# Patient Record
Sex: FEMALE | Race: BLACK OR AFRICAN AMERICAN | Employment: FULL TIME | URBAN - METROPOLITAN AREA
[De-identification: names, ages, dates, MRNs, and addresses within clinical notes are randomized per-mention and may not be internally consistent; named-entity substitution may affect disease eponyms.]

---

## 2017-12-15 PROBLEM — E66.01 OBESITY, MORBID (HCC): Status: ACTIVE | Noted: 2017-12-15

## 2018-05-02 ENCOUNTER — HOSPITAL ENCOUNTER (OUTPATIENT)
Dept: ULTRASOUND IMAGING | Age: 38
Discharge: HOME | End: 2018-05-02
Attending: FAMILY MEDICINE
Payer: COMMERCIAL

## 2018-05-02 DIAGNOSIS — M79.89 LEG SWELLING: ICD-10-CM

## 2018-05-02 PROCEDURE — 93970 EXTREMITY STUDY: CPT

## 2018-05-09 ENCOUNTER — HOSPITAL ENCOUNTER (OUTPATIENT)
Dept: MAMMOGRAPHY | Age: 38
Discharge: HOME | End: 2018-05-09
Attending: FAMILY MEDICINE
Payer: COMMERCIAL

## 2018-05-09 DIAGNOSIS — Z12.39 SCREENING FOR BREAST CANCER: ICD-10-CM

## 2018-05-09 PROCEDURE — 77067 SCR MAMMO BI INCL CAD: CPT

## 2018-09-12 ENCOUNTER — HOSPITAL ENCOUNTER (OUTPATIENT)
Dept: MRI IMAGING | Age: 38
Discharge: HOME | End: 2018-09-12
Attending: FAMILY MEDICINE
Payer: COMMERCIAL

## 2018-09-12 DIAGNOSIS — G89.29 CHRONIC PAIN OF LEFT KNEE: ICD-10-CM

## 2018-09-12 DIAGNOSIS — M25.462 SWELLING OF LEFT KNEE JOINT: ICD-10-CM

## 2018-09-12 DIAGNOSIS — M25.562 CHRONIC PAIN OF LEFT KNEE: ICD-10-CM

## 2018-09-12 PROCEDURE — 73721 MRI JNT OF LWR EXTRE W/O DYE: CPT

## 2019-08-02 ENCOUNTER — HOSPITAL ENCOUNTER (OUTPATIENT)
Dept: PHYSICAL THERAPY | Age: 39
Discharge: HOME | End: 2019-08-02
Attending: FAMILY MEDICINE
Payer: COMMERCIAL

## 2019-08-02 DIAGNOSIS — M79.605 LOW BACK PAIN RADIATING TO LEFT LOWER EXTREMITY: ICD-10-CM

## 2019-08-02 DIAGNOSIS — M54.50 LOW BACK PAIN RADIATING TO LEFT LOWER EXTREMITY: ICD-10-CM

## 2019-08-02 PROCEDURE — 97161 PT EVAL LOW COMPLEX 20 MIN: CPT

## 2019-08-02 PROCEDURE — 97140 MANUAL THERAPY 1/> REGIONS: CPT

## 2019-08-02 NOTE — PROGRESS NOTES
Alis Elkins  : 1980  Primary: [facility]*  Secondary:  [facility], [address] [facility], [address]  [phone]   [phone]       OUTPATIENT PHYSICAL THERAPY: Daily Treatment Note 2019  Visit Count:  1  ICD-10: Treatment Diagnosis: Low back pain (M54.5)  Pain in left leg (M79.605)   Difficulty in walking, not elsewhere classified (R26.2)  Precautions/Allergies:   Lortab [hydrocodone-acetaminophen]   TREATMENT PLAN:  Effective Dates/Frequency/Duration: Twice per week from 2019 until 10/1/2019 (8 weeks). Pre-treatment Symptoms/Complaints:  See history in chart. Pain: Initial:   -2/10 Post Session:  1/10   Medications Last Reviewed:  2019  Updated Objective Findings: See evaluation note from today   TREATMENT:   MANUAL THERAPY: (10 minutes): Joint mobilization and Soft tissue mobilization was utilized and necessary because of the patient's restricted joint motion, painful spasm, loss of articular motion and restricted motion of soft tissue. With pt in hooklying position, performed muscle energy technique at pelvis (resisted R hip flexion and L hip extension and vice versa x 3 reps, followed by resisted hip abduction/adduction x 3 reps, and bridging x 3 reps) x 1 sets to improve pelvic symmetry and reduce muscular tightness and pain. With pt in supported prone position, STM and trigger point release at L latissimus dorsi, erector spinae, quadratus lumborum and thoracolumbar fascia and glutes to reduce muscular tightness and pain. Modalities (10 minutes): With pt in supported prone position, cold pack (with pillowcase layer) applied to entire lower back to reduce pain and inflammation at end of session. Treatment/Session Summary:    · Response to Treatment:  See assessment in chart.         No adverse reactions/unusual changes observed/reported in clinical status this session. · Communication/Consultation:  None today  · Equipment provided today:  None today  · Recommendations/Intent for next treatment session: Next visit will focus on continuing to address pelvic/leg length asymmetry; work on core/LE strengthening exercises; manual therapy/modalities as needed to reduce pain.     Total Treatment Billable Duration:  10 minutes  PT Patient Time In/Time Out  Time In: 1440  Time Out: [address] [facility]    Future Appointments   Date Time Provider Yari Ross   8/22/2019  8:45 AM Mike Jackson MD [facility] PRE PRE
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
## 2020-01-06 NOTE — THERAPY DISCHARGE
Miguel Naidu  : 1980  Primary: [facility]*  Secondary:  [facility], [address] [facility], [place] [address] [facility]  [phone]   [phone]        OUTPATIENT PHYSICAL THERAPY:Discontinuation Summary 2019   ICD-10: Treatment Diagnosis: Low back pain (M54.5)  Pain in left leg (M79.605)   Difficulty in walking, not elsewhere classified (R26.2)  Precautions/Allergies:   Lortab [hydrocodone-acetaminophen]   TREATMENT PLAN:  Effective Dates/Frequency/Duration: Twice per week from 2019 until 10/1/2019 (8 weeks). MEDICAL/REFERRING DIAGNOSIS:  Low back pain radiating to left lower extremity [M54.5, M79.605]   DATE OF ONSET: 2019  REFERRING PHYSICIAN: Rosa Arce*  MD Orders: Evaluate and treat  Return MD Appointment: unspecified     Miguel Naidu has been seen in physical therapy on 2019 for 1 visits. Treatment has been discontinued at this time due to patient noncompliant with attending/scheduling PT sessions. The below goals were not reassessed secondary to pt failing to attend additional sessions prior to discharge. Thank you for this referral.    PROBLEM LIST (Impacting functional limitations):  1. Decreased Strength  2. Decreased ADL/Functional Activities  3. Decreased Transfer Abilities  4. Decreased Ambulation Ability/Technique  5. Decreased Balance  6. Increased Pain  7. Decreased Activity Tolerance  8. Decreased Pacing Skills  9. Decreased Work Simplification/Energy Conservation Techniques  10. Decreased Flexibility/Joint Mobility  11. Edema/Girth INTERVENTIONS PLANNED: (Treatment may consist of any combination of the following)  1. Balance Exercise  2. Bed Mobility  3. Cold  4. Electrical Stimulation  5. Family Education  6. Gait Training  7. Heat  8. Home Exercise Program (HEP)  9. Manual Therapy  10. Neuromuscular Re-education/Strengthening  11.  Range of Motion (ROM)  12. Therapeutic Activites  13. Therapeutic Exercise/Strengthening  14. Transcutaneous Electrical Nerve Stimulation (TENS)  15. Transfer Training  16. Ultrasound (US)  17. Aquatic Therapy (if land therapy does not result in sufficient improvement)     GOALS: (Goals have been discussed and agreed upon with patient.)  Discharge Goals: Time Frame: 8 weeks  1. Pt will be independent with HEP in order to increase lumbar mobility and LE and core strength/endurance to improve quality of life. 2. Pt will reduce score on Modified Oswestry Low Back Pain Questionnaire to 5/50 in order to demonstrate improved functional mobility and quality of life. 3. Pt will report being able to perform standing activities for > 90 minutes with minimal to no increase in pain in order to be able to stand for prolonged periods as needed to perform job tasks at work. 4. Pt will be able to ascend/descend 12 steps with independence without rail with reciprocal gait pattern with minimal to no increase in pain in order to improve community mobility. 5. Pt will demonstrate increase in lumbar extension AROM to 100% or more of normal with minimal to no increase in pain in order to improve functional mobility and ability to stand at work with no pain. OUTCOME MEASURE:   Tool Used: Modified Oswestry Low Back Pain Questionnaire  Score:  Initial: 11/50  Most Recent: X/50 (Date: -- )   Interpretation of Score: Each section is scored on a 0-5 scale, 5 representing the greatest disability. The scores of each section are added together for a total score of 50. FALL RISK:    Ambulatory/Rehab Services H2 Model Falls Risk Assessment   Risk Factors:       No Risk Factors Identified Ability to Rise from Chair:       (0)  Ability to rise in a single movement   Falls Prevention Plan:       No modifications necessary   Total: (5 or greater = High Risk): 0   ©2010 [facility] of [employer]. All Rights Reserved. [facility] Patent #[id number].  [facility] Law prohibits the replication, distribution or use without written permission from [facility] of [employer] you for this referral,  Davian Andrade DPT     Referring Physician Signature: Kai Dempsey* No Signature is Required for this note.

## 2020-02-11 ENCOUNTER — HOSPITAL ENCOUNTER (OUTPATIENT)
Dept: MAMMOGRAPHY | Age: 40
Discharge: HOME | End: 2020-02-11

## 2020-02-11 DIAGNOSIS — Z12.39 BREAST CANCER SCREENING: ICD-10-CM

## 2020-06-03 ENCOUNTER — HOSPITAL ENCOUNTER (OUTPATIENT)
Dept: ULTRASOUND IMAGING | Age: 40
Discharge: HOME | End: 2020-06-03
Attending: FAMILY MEDICINE

## 2020-06-03 DIAGNOSIS — D50.9 IRON DEFICIENCY ANEMIA, UNSPECIFIED IRON DEFICIENCY ANEMIA TYPE: ICD-10-CM

## 2020-06-03 DIAGNOSIS — N92.0 MENORRHAGIA WITH REGULAR CYCLE: ICD-10-CM

## 2021-02-20 ENCOUNTER — HOSPITAL ENCOUNTER (OUTPATIENT)
Dept: MAMMOGRAPHY | Age: 41
Discharge: HOME | End: 2021-02-20
Attending: FAMILY MEDICINE
Payer: COMMERCIAL

## 2021-02-20 DIAGNOSIS — Z12.31 VISIT FOR SCREENING MAMMOGRAM: ICD-10-CM

## 2021-02-20 PROCEDURE — 77063 BREAST TOMOSYNTHESIS BI: CPT

## 2021-08-09 ENCOUNTER — HOSPITAL ENCOUNTER (OUTPATIENT)
Dept: SURGERY | Age: 41
Discharge: HOME | End: 2021-08-09

## 2021-08-09 VITALS — HEIGHT: 65 IN | BODY MASS INDEX: 44.65 KG/M2 | WEIGHT: 268 LBS

## 2021-08-09 NOTE — PERIOP NOTES
Patient verified name and . Order for consent found in EHR and matches case posting; patient verifies procedure. Hysteroscopy with D&C and endometrial ablation/possible removal of endometrial polyp    Type 1B surgery, PAT phone assessment complete. Orders received. Labs per surgeon: CBC- Pt instructed to come for lab work (Monday- Friday 8:00 AM- 3:30 PM) prior to surgery day. Pt voiced an understanding. Labs per anesthesia protocol: no additional lab work needed. Patient COVID test date 8/10/21; Patient aware of the appointment. The testing center is located at the [address], [place]. If appointment is needed-patient provided telephone number of [phone]. Patient answered medical/surgical history questions at their best of ability. All prior to admission medications documented in [facility]. Patient instructed to take the following medications the day of surgery according to anesthesia guidelines with a small sip of water: prozac. On the day before surgery please take Acetaminophen 1000mg in the morning and then again before bed. You may substitute for Tylenol 650 mg. Hold all vitamins/supplements 7 days prior to surgery and NSAIDS 5 days prior to surgery. Patient instructed on the following:    > Arrive at A Entrance, time of arrival to be called the day before by 1700  > NPO after midnight including gum, mints, and ice chips  > Responsible adult must drive patient to the hospital, stay during surgery, and patient will need supervision 24 hours after anesthesia  > Use antibacterial soap in shower the night before surgery and on the morning of surgery  > All piercings must be removed prior to arrival.    > Leave all valuables (money and jewelry) at home but bring insurance card and ID on DOS.   > You may be required to pay a deductible or co-pay on the day of your procedure.  You can pre-pay by calling [phone] if your surgery is at the [address] or [phone] if your surgery is at the [facility]. > Do not wear make-up, nail polish, lotions, cologne, perfumes, powders, or oil on skin. Artificial nails are not permitted.

## 2021-08-10 ENCOUNTER — HOSPITAL ENCOUNTER (OUTPATIENT)
Dept: LAB | Age: 41
Discharge: HOME | End: 2021-08-10
Attending: OBSTETRICS & GYNECOLOGY
Payer: COMMERCIAL

## 2021-08-10 LAB
ERYTHROCYTE [DISTWIDTH] IN BLOOD BY AUTOMATED COUNT: 17.1 % (ref 11.9–14.6)
HCT VFR BLD AUTO: 23.2 % (ref 35.8–46.3)
HGB BLD-MCNC: 6.7 G/DL (ref 11.7–15.4)
MCH RBC QN AUTO: 21.7 PG (ref 26.1–32.9)
MCHC RBC AUTO-ENTMCNC: 28.9 G/DL (ref 31.4–35)
MCV RBC AUTO: 75.1 FL (ref 79.6–97.8)
NRBC # BLD: 0 K/UL (ref 0–0.2)
PLATELET # BLD AUTO: 474 K/UL (ref 150–450)
PMV BLD AUTO: 10.1 FL (ref 9.4–12.3)
RBC # BLD AUTO: 3.09 M/UL (ref 4.05–5.2)
WBC # BLD AUTO: 6.1 K/UL (ref 4.3–11.1)

## 2021-08-10 PROCEDURE — 36415 COLL VENOUS BLD VENIPUNCTURE: CPT

## 2021-08-10 PROCEDURE — 85027 COMPLETE CBC AUTOMATED: CPT

## 2021-08-10 RX ORDER — SODIUM CHLORIDE 9 MG/ML
250 INJECTION, SOLUTION INTRAVENOUS AS NEEDED
Status: CANCELLED | OUTPATIENT
Start: 2021-08-10

## 2021-08-10 NOTE — PERIOP NOTES
Received call from [facility] (lab) informing that patient has critical HGB of 6.7. This RN called surgeon's office reported  Results to Yoly Martini RN who states she will report to MD.     This RN called Dr. Shahid Logan (anesthesia) and reported results. Per Dr. Shahid Logan T&C for 1 unit is needed DOS. Order signed and held in EHR.

## 2021-08-10 NOTE — PERIOP NOTES
The below lab results routed via [address] [facility] to surgeon per protocol. Hgb results reported via phone to both surgeon's office and anesthesia (see previous note). All other lab results within anesthesia guidelines.      Recent Results (from the past 12 hour(s))   CBC W/O DIFF    Collection Time: 08/10/21  9:25 AM   Result Value Ref Range    WBC 6.1 4.3 - 11.1 K/uL    RBC 3.09 (L) 4.05 - 5.2 M/uL    HGB 6.7 (LL) 11.7 - 15.4 g/dL    HCT 23.2 (L) 35.8 - 46.3 %    MCV 75.1 (L) 79.6 - 97.8 FL    MCH 21.7 (L) 26.1 - 32.9 PG    MCHC 28.9 (L) 31.4 - 35.0 g/dL    RDW 17.1 (H) 11.9 - 14.6 %    PLATELET 535 (H) 604 - 450 K/uL    MPV 10.1 9.4 - 12.3 FL    ABSOLUTE NRBC 0.00 0.0 - 0.2 K/uL

## 2021-08-12 ENCOUNTER — ANESTHESIA EVENT (OUTPATIENT)
Dept: SURGERY | Age: 41
End: 2021-08-12
Payer: COMMERCIAL

## 2021-08-13 ENCOUNTER — HOSPITAL ENCOUNTER (OUTPATIENT)
Age: 41
Setting detail: OUTPATIENT SURGERY
Discharge: HOME | End: 2021-08-13
Attending: OBSTETRICS & GYNECOLOGY | Admitting: OBSTETRICS & GYNECOLOGY
Payer: COMMERCIAL

## 2021-08-13 ENCOUNTER — ANESTHESIA (OUTPATIENT)
Dept: SURGERY | Age: 41
End: 2021-08-13
Payer: COMMERCIAL

## 2021-08-13 VITALS
DIASTOLIC BLOOD PRESSURE: 65 MMHG | HEIGHT: 65 IN | SYSTOLIC BLOOD PRESSURE: 133 MMHG | TEMPERATURE: 99.5 F | RESPIRATION RATE: 16 BRPM | BODY MASS INDEX: 45.48 KG/M2 | WEIGHT: 273 LBS | OXYGEN SATURATION: 95 % | HEART RATE: 83 BPM

## 2021-08-13 DIAGNOSIS — G89.18 POST-OP PAIN: Primary | ICD-10-CM

## 2021-08-13 DIAGNOSIS — N92.0 MENORRHAGIA WITH REGULAR CYCLE: ICD-10-CM

## 2021-08-13 DIAGNOSIS — D50.0 IRON DEFICIENCY ANEMIA DUE TO CHRONIC BLOOD LOSS: ICD-10-CM

## 2021-08-13 LAB
HCG SERPL QL: NEGATIVE
HGB BLD-MCNC: 7 G/DL (ref 11.7–15.4)
HISTORY CHECKED?,CKHIST: NORMAL

## 2021-08-13 PROCEDURE — 74011250637 HC RX REV CODE- 250/637: Performed by: ANESTHESIOLOGY

## 2021-08-13 PROCEDURE — 85018 HEMOGLOBIN: CPT

## 2021-08-13 PROCEDURE — 58563 HYSTEROSCOPY ABLATION: CPT | Performed by: OBSTETRICS & GYNECOLOGY

## 2021-08-13 PROCEDURE — 76210000020 HC REC RM PH II FIRST 0.5 HR: Performed by: OBSTETRICS & GYNECOLOGY

## 2021-08-13 PROCEDURE — 2709999900 HC NON-CHARGEABLE SUPPLY: Performed by: OBSTETRICS & GYNECOLOGY

## 2021-08-13 PROCEDURE — 76010000149 HC OR TIME 1 TO 1.5 HR: Performed by: OBSTETRICS & GYNECOLOGY

## 2021-08-13 PROCEDURE — 76210000006 HC OR PH I REC 0.5 TO 1 HR: Performed by: OBSTETRICS & GYNECOLOGY

## 2021-08-13 PROCEDURE — 86923 COMPATIBILITY TEST ELECTRIC: CPT

## 2021-08-13 PROCEDURE — 86901 BLOOD TYPING SEROLOGIC RH(D): CPT

## 2021-08-13 PROCEDURE — 76060000033 HC ANESTHESIA 1 TO 1.5 HR: Performed by: OBSTETRICS & GYNECOLOGY

## 2021-08-13 PROCEDURE — 77030040922 HC BLNKT HYPOTHRM STRY -A: Performed by: ANESTHESIOLOGY

## 2021-08-13 PROCEDURE — 84703 CHORIONIC GONADOTROPIN ASSAY: CPT

## 2021-08-13 PROCEDURE — 77030010509 HC AIRWY LMA MSK TELE -A: Performed by: ANESTHESIOLOGY

## 2021-08-13 PROCEDURE — 77030034928 HC DEV UTER SURSND KT HOLO -G1: Performed by: OBSTETRICS & GYNECOLOGY

## 2021-08-13 PROCEDURE — 77030019927 HC TBNG IRR CYSTO BAXT -A: Performed by: OBSTETRICS & GYNECOLOGY

## 2021-08-13 PROCEDURE — 77030040361 HC SLV COMPR DVT MDII -B: Performed by: OBSTETRICS & GYNECOLOGY

## 2021-08-13 PROCEDURE — 77030002888 HC SUT CHRMC J&J -A: Performed by: OBSTETRICS & GYNECOLOGY

## 2021-08-13 PROCEDURE — 74011250636 HC RX REV CODE- 250/636: Performed by: ANESTHESIOLOGY

## 2021-08-13 PROCEDURE — 74011000250 HC RX REV CODE- 250: Performed by: ANESTHESIOLOGY

## 2021-08-13 PROCEDURE — 88305 TISSUE EXAM BY PATHOLOGIST: CPT

## 2021-08-13 RX ORDER — NALOXONE HYDROCHLORIDE 0.4 MG/ML
0.1 INJECTION, SOLUTION INTRAMUSCULAR; INTRAVENOUS; SUBCUTANEOUS AS NEEDED
Status: DISCONTINUED | OUTPATIENT
Start: 2021-08-13 | End: 2021-08-13 | Stop reason: HOSPADM

## 2021-08-13 RX ORDER — EPHEDRINE SULFATE/0.9% NACL/PF 50 MG/5 ML
SYRINGE (ML) INTRAVENOUS AS NEEDED
Status: DISCONTINUED | OUTPATIENT
Start: 2021-08-13 | End: 2021-08-13 | Stop reason: HOSPADM

## 2021-08-13 RX ORDER — ALBUTEROL SULFATE 0.83 MG/ML
2.5 SOLUTION RESPIRATORY (INHALATION) AS NEEDED
Status: DISCONTINUED | OUTPATIENT
Start: 2021-08-13 | End: 2021-08-13 | Stop reason: HOSPADM

## 2021-08-13 RX ORDER — SODIUM CHLORIDE, SODIUM LACTATE, POTASSIUM CHLORIDE, CALCIUM CHLORIDE 600; 310; 30; 20 MG/100ML; MG/100ML; MG/100ML; MG/100ML
100 INJECTION, SOLUTION INTRAVENOUS CONTINUOUS
Status: DISCONTINUED | OUTPATIENT
Start: 2021-08-13 | End: 2021-08-13 | Stop reason: HOSPADM

## 2021-08-13 RX ORDER — SODIUM CHLORIDE 9 MG/ML
250 INJECTION, SOLUTION INTRAVENOUS AS NEEDED
Status: DISCONTINUED | OUTPATIENT
Start: 2021-08-13 | End: 2021-08-13 | Stop reason: HOSPADM

## 2021-08-13 RX ORDER — FENTANYL CITRATE 50 UG/ML
INJECTION, SOLUTION INTRAMUSCULAR; INTRAVENOUS AS NEEDED
Status: DISCONTINUED | OUTPATIENT
Start: 2021-08-13 | End: 2021-08-13 | Stop reason: HOSPADM

## 2021-08-13 RX ORDER — FENTANYL CITRATE 50 UG/ML
100 INJECTION, SOLUTION INTRAMUSCULAR; INTRAVENOUS ONCE
Status: DISCONTINUED | OUTPATIENT
Start: 2021-08-13 | End: 2021-08-13 | Stop reason: HOSPADM

## 2021-08-13 RX ORDER — SODIUM CHLORIDE 0.9 % (FLUSH) 0.9 %
5-40 SYRINGE (ML) INJECTION EVERY 8 HOURS
Status: DISCONTINUED | OUTPATIENT
Start: 2021-08-13 | End: 2021-08-13 | Stop reason: HOSPADM

## 2021-08-13 RX ORDER — PROPOFOL 10 MG/ML
INJECTION, EMULSION INTRAVENOUS AS NEEDED
Status: DISCONTINUED | OUTPATIENT
Start: 2021-08-13 | End: 2021-08-13 | Stop reason: HOSPADM

## 2021-08-13 RX ORDER — LIDOCAINE HYDROCHLORIDE 10 MG/ML
0.1 INJECTION INFILTRATION; PERINEURAL AS NEEDED
Status: DISCONTINUED | OUTPATIENT
Start: 2021-08-13 | End: 2021-08-13 | Stop reason: HOSPADM

## 2021-08-13 RX ORDER — OXYCODONE HYDROCHLORIDE 5 MG/1
5 TABLET ORAL
Status: COMPLETED | OUTPATIENT
Start: 2021-08-13 | End: 2021-08-13

## 2021-08-13 RX ORDER — TRAMADOL HYDROCHLORIDE 50 MG/1
50 TABLET ORAL
Qty: 30 TABLET | Refills: 0 | Status: SHIPPED | OUTPATIENT
Start: 2021-08-13 | End: 2021-08-18

## 2021-08-13 RX ORDER — DEXAMETHASONE SODIUM PHOSPHATE 4 MG/ML
INJECTION, SOLUTION INTRA-ARTICULAR; INTRALESIONAL; INTRAMUSCULAR; INTRAVENOUS; SOFT TISSUE AS NEEDED
Status: DISCONTINUED | OUTPATIENT
Start: 2021-08-13 | End: 2021-08-13 | Stop reason: HOSPADM

## 2021-08-13 RX ORDER — ONDANSETRON 2 MG/ML
INJECTION INTRAMUSCULAR; INTRAVENOUS AS NEEDED
Status: DISCONTINUED | OUTPATIENT
Start: 2021-08-13 | End: 2021-08-13 | Stop reason: HOSPADM

## 2021-08-13 RX ORDER — SODIUM CHLORIDE 0.9 % (FLUSH) 0.9 %
5-40 SYRINGE (ML) INJECTION AS NEEDED
Status: DISCONTINUED | OUTPATIENT
Start: 2021-08-13 | End: 2021-08-13 | Stop reason: HOSPADM

## 2021-08-13 RX ORDER — MIDAZOLAM HYDROCHLORIDE 1 MG/ML
2 INJECTION, SOLUTION INTRAMUSCULAR; INTRAVENOUS
Status: DISCONTINUED | OUTPATIENT
Start: 2021-08-13 | End: 2021-08-13 | Stop reason: HOSPADM

## 2021-08-13 RX ORDER — ONDANSETRON 2 MG/ML
4 INJECTION INTRAMUSCULAR; INTRAVENOUS ONCE
Status: DISCONTINUED | OUTPATIENT
Start: 2021-08-13 | End: 2021-08-13 | Stop reason: HOSPADM

## 2021-08-13 RX ORDER — HYDROMORPHONE HYDROCHLORIDE 2 MG/ML
0.5 INJECTION, SOLUTION INTRAMUSCULAR; INTRAVENOUS; SUBCUTANEOUS
Status: DISCONTINUED | OUTPATIENT
Start: 2021-08-13 | End: 2021-08-13 | Stop reason: HOSPADM

## 2021-08-13 RX ORDER — DIPHENHYDRAMINE HYDROCHLORIDE 50 MG/ML
12.5 INJECTION, SOLUTION INTRAMUSCULAR; INTRAVENOUS
Status: DISCONTINUED | OUTPATIENT
Start: 2021-08-13 | End: 2021-08-13 | Stop reason: HOSPADM

## 2021-08-13 RX ORDER — LIDOCAINE HYDROCHLORIDE 20 MG/ML
INJECTION, SOLUTION EPIDURAL; INFILTRATION; INTRACAUDAL; PERINEURAL AS NEEDED
Status: DISCONTINUED | OUTPATIENT
Start: 2021-08-13 | End: 2021-08-13 | Stop reason: HOSPADM

## 2021-08-13 RX ORDER — MIDAZOLAM HYDROCHLORIDE 1 MG/ML
2 INJECTION, SOLUTION INTRAMUSCULAR; INTRAVENOUS ONCE
Status: DISCONTINUED | OUTPATIENT
Start: 2021-08-13 | End: 2021-08-13 | Stop reason: HOSPADM

## 2021-08-13 RX ORDER — OXYCODONE HYDROCHLORIDE 5 MG/1
10 TABLET ORAL
Status: DISCONTINUED | OUTPATIENT
Start: 2021-08-13 | End: 2021-08-13 | Stop reason: HOSPADM

## 2021-08-13 RX ADMIN — LIDOCAINE HYDROCHLORIDE 100 MG: 20 INJECTION, SOLUTION EPIDURAL; INFILTRATION; INTRACAUDAL; PERINEURAL at 07:49

## 2021-08-13 RX ADMIN — FENTANYL CITRATE 50 MCG: 50 INJECTION INTRAMUSCULAR; INTRAVENOUS at 08:11

## 2021-08-13 RX ADMIN — FENTANYL CITRATE 50 MCG: 50 INJECTION INTRAMUSCULAR; INTRAVENOUS at 07:49

## 2021-08-13 RX ADMIN — PROPOFOL 200 MG: 10 INJECTION, EMULSION INTRAVENOUS at 07:49

## 2021-08-13 RX ADMIN — Medication 10 MG: at 07:57

## 2021-08-13 RX ADMIN — Medication 5 MG: at 08:03

## 2021-08-13 RX ADMIN — DEXAMETHASONE SODIUM PHOSPHATE 8 MG: 4 INJECTION, SOLUTION INTRAMUSCULAR; INTRAVENOUS at 07:54

## 2021-08-13 RX ADMIN — SODIUM CHLORIDE, SODIUM LACTATE, POTASSIUM CHLORIDE, AND CALCIUM CHLORIDE: 600; 310; 30; 20 INJECTION, SOLUTION INTRAVENOUS at 07:43

## 2021-08-13 RX ADMIN — ONDANSETRON 4 MG: 2 INJECTION INTRAMUSCULAR; INTRAVENOUS at 07:54

## 2021-08-13 RX ADMIN — OXYCODONE HYDROCHLORIDE 5 MG: 5 TABLET ORAL at 09:42

## 2021-08-13 RX ADMIN — SODIUM CHLORIDE, SODIUM LACTATE, POTASSIUM CHLORIDE, AND CALCIUM CHLORIDE 100 ML/HR: 600; 310; 30; 20 INJECTION, SOLUTION INTRAVENOUS at 06:59

## 2021-08-13 NOTE — ANESTHESIA PREPROCEDURE EVALUATION
Relevant Problems   NEUROLOGY   (+) Generalized anxiety disorder   (+) Headache in front of head      ENDOCRINE   (+) Obesity, morbid (HCC)      HEMATOLOGY   (+) Iron deficiency anemia       Anesthetic History   No history of anesthetic complications            Review of Systems / Medical History  Patient summary reviewed, nursing notes reviewed and pertinent labs reviewed    Pulmonary  Within defined limits                 Neuro/Psych   Within defined limits           Cardiovascular                  Exercise tolerance: >4 METS     GI/Hepatic/Renal  Within defined limits              Endo/Other        Morbid obesity     Other Findings              Physical Exam    Airway  Mallampati: II  TM Distance: 4 - 6 cm  Neck ROM: normal range of motion   Mouth opening: Normal     Cardiovascular  Regular rate and rhythm,  S1 and S2 normal,  no murmur, click, rub, or gallop             Dental  No notable dental hx       Pulmonary  Breath sounds clear to auscultation               Abdominal         Other Findings            Anesthetic Plan    ASA: 2  Anesthesia type: general          Induction: Intravenous  Anesthetic plan and risks discussed with: Patient

## 2021-08-13 NOTE — DISCHARGE INSTRUCTIONS
INSTRUCTIONS FOLLOWING HYSTEROSCOPY    ACTIVITY   Limited activity today; increase as tolerated tomorrow, but no vigorous exercise   Shower only; no tub baths   No douches, tampons or intercourse until your doctor releases you (at least 2 weeks)   May return to work or school as directed by your doctor      You will probably have bloody discharge (like a period) for 1-2 days, then watery discharge for another 7 days. You will want to use a perineal pad, not tampons for this. DIET   Clear liquids until no nausea or vomiting   Advance to regular diet as tolerated       PAIN   Expect a moderate amount of pain.  Take pain medication as directed by your doctor. If no prescription for pain is sent         home with you, take the appropriate dose of your commonly used pain medication.  Call your doctor if pain is NOT relieved by medication.  DO NOT take aspirin or blood thinners until directed by your doctor. FOLLOW-UP PHONE CALLS     If you have any problems, call your doctor as needed. CALL YOUR DOCTOR IF   Excessive bleeding that soaks a pad in an hour   Temperature of 101°F or above   Green or yellow, smelly discharge   Unable to urinate by bedtime   Nausea and vomiting that does not stop by bedtime    MEDICATION INTERACTION:  During your procedure you potentially received a medication or medications which may reduce the effectiveness of oral contraceptives. Please consider other forms of contraception for 1 month following your procedure if you are currently using oral contraceptives as your primary form of birth control.  In addition to this, we recommend continuing your oral contraceptive as prescribed, unless otherwise instructed by your physician, during this time    After general anesthesia or intravenous sedation, for 24 hours or while taking prescription Narcotics:  · Limit your activities  · A responsible adult needs to be with you for the next 24 hours  · Do not drive and operate hazardous machinery  · Do not make important personal or business decisions  · Do not drink alcoholic beverages  · If you have not urinated within 8 hours after discharge, and you are experiencing discomfort from urinary retention, please go to the nearest ED. · If you have sleep apnea and have a CPAP machine, please use it for all naps and sleeping. · Please use caution when taking narcotics and any of your home medications that may cause drowsiness. *  Please give a list of your current medications to your Primary Care Provider. *  Please update this list whenever your medications are discontinued, doses are      changed, or new medications (including over-the-counter products) are added. *  Please carry medication information at all times in case of emergency situations. These are general instructions for a healthy lifestyle:  No smoking/ No tobacco products/ Avoid exposure to second hand smoke  Surgeon General's Warning:  Quitting smoking now greatly reduces serious risk to your health. Obesity, smoking, and sedentary lifestyle greatly increases your risk for illness  A healthy diet, regular physical exercise & weight monitoring are important for maintaining a healthy lifestyle    You may be retaining fluid if you have a history of heart failure or if you experience any of the following symptoms:  Weight gain of 3 pounds or more overnight or 5 pounds in a week, increased swelling in our hands or feet or shortness of breath while lying flat in bed. Please call your doctor as soon as you notice any of these symptoms; do not wait until your next office visit.

## 2021-08-13 NOTE — ANESTHESIA POSTPROCEDURE EVALUATION
Procedure(s):  DILATATION AND CURETTAGE HYSTEROSCOPY ENDOMETRIAL ABLATION/ NOVASURE.     general    Anesthesia Post Evaluation      Multimodal analgesia: multimodal analgesia used between 6 hours prior to anesthesia start to PACU discharge  Patient location during evaluation: PACU  Patient participation: complete - patient participated  Level of consciousness: awake and awake and alert  Pain management: adequate  Airway patency: patent  Anesthetic complications: no  Cardiovascular status: acceptable  Respiratory status: acceptable  Hydration status: acceptable  Post anesthesia nausea and vomiting:  controlled      INITIAL Post-op Vital signs:   Vitals Value Taken Time   /65 08/13/21 0934   Temp 37.5 °C (99.5 °F) 08/13/21 0858   Pulse 83 08/13/21 0934   Resp 16 08/13/21 0934   SpO2 95 % 08/13/21 0934

## 2021-08-13 NOTE — OP NOTES
HYSTEROSCOPY D & C WITH ENDOMETRIAL ABLATION FULL OP NOTE      NAME:    Ernie Boss    DATE OF PROCEDURE:  8/13/2021    PREOPERATIVE DIAGNOSIS:  Thickened endometrium [R93.89]  Menorrhagia with regular cycle [N92.0]  Endocervical polyp [N84.1]    POSTOPERATIVE DIAGNOSIS:    Menorrhagia with regular cycle [N92.0]    PROCEDURE: Hysteroscopy, dilatation & curettage, NovaSure ablation. SURGEON:  Rayne Wood MD    ASSISTANT:  none    ANESTHESIA: General endotracheal anesthesia. EBL:   minimal  FINDINGS: normal endometrial cavity    Specimen:  emc    PROCEDURE: Patient was placed on the operating table in the supine position. Time out was done to confirm the operating procedure, surgeon, patient and site. Once confirmed by the team, procedure was started. Patient was placed under general endotracheal anesthesia. She was prepped and draped in the usual fashion for vaginal surgery. Cervix was visualized with the aid of a weighted vaginal speculum and grasped with a single-tooth tenaculum and sounded to 8 cm. The cervix was then dilated to 23-Lao. The diagnostic hysteroscope was then placed in the endometrial cavity. Thorough endometrial curettage was performed with endometrial curettings being sent for histologic review, using a medium sharp curette. The NovaSure ablation device was then opened. The uterus sounded to 8 cm with cervical length of 3 cm. The settings on the NovaSure ablation were set for a length of 4 cm and a width of 4.4 cm with treatment for 81 sec at a power level of 97w The Novasure instrument was placed and the ablation was performed. The NovaSure ablater was then removed. Repeat hysteroscopy revealed a very adequate endometrial ablation. There was no bleeding. Instruments were removed. The patient went to the recovery room in satisfactory condition.  She was given a prescription for tramadol, is to follow up in two weeks, and will call with any increased pain, fever, or bleeding.

## 2021-08-17 LAB
ABO + RH BLD: NORMAL
BLD PROD TYP BPU: NORMAL
BLOOD GROUP ANTIBODIES SERPL: NORMAL
BPU ID: NORMAL
CROSSMATCH RESULT,%XM: NORMAL
SPECIMEN EXP DATE BLD: NORMAL
STATUS OF UNIT,%ST: NORMAL
UNIT DIVISION, %UDIV: 0

## 2021-08-21 ENCOUNTER — HOSPITAL ENCOUNTER (EMERGENCY)
Age: 41
Discharge: HOME | End: 2021-08-22
Attending: EMERGENCY MEDICINE
Payer: COMMERCIAL

## 2021-08-21 ENCOUNTER — APPOINTMENT (OUTPATIENT)
Dept: GENERAL RADIOLOGY | Age: 41
End: 2021-08-21
Attending: EMERGENCY MEDICINE
Payer: COMMERCIAL

## 2021-08-21 DIAGNOSIS — D64.9 ANEMIA, UNSPECIFIED TYPE: ICD-10-CM

## 2021-08-21 DIAGNOSIS — R55 SYNCOPE AND COLLAPSE: Primary | ICD-10-CM

## 2021-08-21 LAB
ALBUMIN SERPL-MCNC: 3.2 G/DL (ref 3.5–5)
ALBUMIN/GLOB SERPL: 0.8 {RATIO} (ref 1.2–3.5)
ALP SERPL-CCNC: 55 U/L (ref 50–136)
ALT SERPL-CCNC: 18 U/L (ref 12–65)
ANION GAP SERPL CALC-SCNC: 5 MMOL/L (ref 7–16)
AST SERPL-CCNC: 18 U/L (ref 15–37)
BASOPHILS # BLD: 0.1 K/UL (ref 0–0.2)
BASOPHILS NFR BLD: 1 % (ref 0–2)
BILIRUB SERPL-MCNC: 0.2 MG/DL (ref 0.2–1.1)
BUN SERPL-MCNC: 9 MG/DL (ref 6–23)
CALCIUM SERPL-MCNC: 8.4 MG/DL (ref 8.3–10.4)
CHLORIDE SERPL-SCNC: 107 MMOL/L (ref 98–107)
CO2 SERPL-SCNC: 27 MMOL/L (ref 21–32)
CREAT SERPL-MCNC: 0.89 MG/DL (ref 0.6–1)
DIFFERENTIAL METHOD BLD: ABNORMAL
EOSINOPHIL # BLD: 0.2 K/UL (ref 0–0.8)
EOSINOPHIL NFR BLD: 2 % (ref 0.5–7.8)
ERYTHROCYTE [DISTWIDTH] IN BLOOD BY AUTOMATED COUNT: 17.9 % (ref 11.9–14.6)
GLOBULIN SER CALC-MCNC: 4.2 G/DL (ref 2.3–3.5)
GLUCOSE SERPL-MCNC: 112 MG/DL (ref 65–100)
HCT VFR BLD AUTO: 25.5 % (ref 35.8–46.3)
HGB BLD-MCNC: 7.3 G/DL (ref 11.7–15.4)
HISTORY CHECKED?,CKHIST: NORMAL
IMM GRANULOCYTES # BLD AUTO: 0 K/UL (ref 0–0.5)
IMM GRANULOCYTES NFR BLD AUTO: 0 % (ref 0–5)
LYMPHOCYTES # BLD: 1.9 K/UL (ref 0.5–4.6)
LYMPHOCYTES NFR BLD: 20 % (ref 13–44)
MCH RBC QN AUTO: 21 PG (ref 26.1–32.9)
MCHC RBC AUTO-ENTMCNC: 28.6 G/DL (ref 31.4–35)
MCV RBC AUTO: 73.5 FL (ref 79.6–97.8)
MONOCYTES # BLD: 0.7 K/UL (ref 0.1–1.3)
MONOCYTES NFR BLD: 8 % (ref 4–12)
NEUTS SEG # BLD: 6.7 K/UL (ref 1.7–8.2)
NEUTS SEG NFR BLD: 70 % (ref 43–78)
NRBC # BLD: 0 K/UL (ref 0–0.2)
PLATELET # BLD AUTO: 496 K/UL (ref 150–450)
PMV BLD AUTO: 10.3 FL (ref 9.4–12.3)
POTASSIUM SERPL-SCNC: 3.9 MMOL/L (ref 3.5–5.1)
PROT SERPL-MCNC: 7.4 G/DL (ref 6.3–8.2)
RBC # BLD AUTO: 3.47 M/UL (ref 4.05–5.2)
SODIUM SERPL-SCNC: 139 MMOL/L (ref 136–145)
WBC # BLD AUTO: 9.7 K/UL (ref 4.3–11.1)

## 2021-08-21 PROCEDURE — 74011250636 HC RX REV CODE- 250/636: Performed by: EMERGENCY MEDICINE

## 2021-08-21 PROCEDURE — 85025 COMPLETE CBC W/AUTO DIFF WBC: CPT

## 2021-08-21 PROCEDURE — 99284 EMERGENCY DEPT VISIT MOD MDM: CPT

## 2021-08-21 PROCEDURE — 71045 X-RAY EXAM CHEST 1 VIEW: CPT

## 2021-08-21 PROCEDURE — 80053 COMPREHEN METABOLIC PANEL: CPT

## 2021-08-21 PROCEDURE — 86850 RBC ANTIBODY SCREEN: CPT

## 2021-08-21 PROCEDURE — 93005 ELECTROCARDIOGRAM TRACING: CPT | Performed by: EMERGENCY MEDICINE

## 2021-08-21 PROCEDURE — 86923 COMPATIBILITY TEST ELECTRIC: CPT

## 2021-08-21 RX ORDER — SODIUM CHLORIDE 0.9 % (FLUSH) 0.9 %
5-10 SYRINGE (ML) INJECTION AS NEEDED
Status: DISCONTINUED | OUTPATIENT
Start: 2021-08-21 | End: 2021-08-22 | Stop reason: HOSPADM

## 2021-08-21 RX ORDER — SODIUM CHLORIDE 0.9 % (FLUSH) 0.9 %
5-10 SYRINGE (ML) INJECTION EVERY 8 HOURS
Status: DISCONTINUED | OUTPATIENT
Start: 2021-08-21 | End: 2021-08-22 | Stop reason: HOSPADM

## 2021-08-21 RX ORDER — SODIUM CHLORIDE 9 MG/ML
250 INJECTION, SOLUTION INTRAVENOUS AS NEEDED
Status: DISCONTINUED | OUTPATIENT
Start: 2021-08-21 | End: 2021-08-22 | Stop reason: HOSPADM

## 2021-08-21 RX ADMIN — SODIUM CHLORIDE 1000 ML: 900 INJECTION, SOLUTION INTRAVENOUS at 23:21

## 2021-08-22 VITALS
HEART RATE: 70 BPM | OXYGEN SATURATION: 100 % | TEMPERATURE: 98.1 F | RESPIRATION RATE: 15 BRPM | BODY MASS INDEX: 45.43 KG/M2 | DIASTOLIC BLOOD PRESSURE: 75 MMHG | WEIGHT: 273 LBS | SYSTOLIC BLOOD PRESSURE: 131 MMHG

## 2021-08-22 LAB
ATRIAL RATE: 90 BPM
CALCULATED P AXIS, ECG09: 53 DEGREES
CALCULATED R AXIS, ECG10: 33 DEGREES
CALCULATED T AXIS, ECG11: 43 DEGREES
DIAGNOSIS, 93000: NORMAL
P-R INTERVAL, ECG05: 166 MS
Q-T INTERVAL, ECG07: 352 MS
QRS DURATION, ECG06: 78 MS
QTC CALCULATION (BEZET), ECG08: 430 MS
VENTRICULAR RATE, ECG03: 90 BPM

## 2021-08-22 PROCEDURE — P9016 RBC LEUKOCYTES REDUCED: HCPCS

## 2021-08-22 PROCEDURE — 36430 TRANSFUSION BLD/BLD COMPNT: CPT

## 2021-08-22 RX ORDER — LANOLIN ALCOHOL/MO/W.PET/CERES
325 CREAM (GRAM) TOPICAL DAILY
Qty: 30 TABLET | Refills: 0 | Status: SHIPPED | OUTPATIENT
Start: 2021-08-22 | End: 2021-08-24 | Stop reason: SINTOL

## 2021-08-22 NOTE — ED NOTES
Pt states she was in the mall in [place] and passed out states she came home instead of going to the hospital there pt states when she passed out she felt really hot and felt her heart racing

## 2021-08-22 NOTE — ED NOTES
I have reviewed discharge instructions with the patient. The patient verbalized understanding. Patient left ED via Discharge Method: ambulatory to Home with (insert name of family/friend, self, transport self). Opportunity for questions and clarification provided. Patient given 1 scripts. To continue your aftercare when you leave the hospital, you may receive an automated call from our care team to check in on how you are doing. This is a free service and part of our promise to provide the best care and service to meet your aftercare needs.  If you have questions, or wish to unsubscribe from this service please call [phone]. Thank you for Choosing our [facility] Emergency Department.

## 2021-08-22 NOTE — ED PROVIDER NOTES
Mask was worn during the entire patient examination. Ebony Kocher is a 35 y.o. female who presents to the ED with a chief complaint of syncope. Patient states she had episode today where she was in the mall and did not quite remember what happened she passed completely out and the next thing she noticed there was EMS personnel around her. She currently feels better. Has had some fatigue shortness of breath and weakness since having a D&C done last week. She does have a history of chronic anemia and takes iron supplementations. However her hemoglobin has dropped to around 7 and she states they consider giving her blood transfusion last week but did not. She has never had 1. She does not have any new vaginal bleeding today. The history is provided by the patient. Syncope   Pertinent negatives include no chest pain, no palpitations, no fever, no abdominal pain, no nausea, no vomiting, no headaches, no dizziness, no light-headedness, no seizures and no weakness. Her past medical history is significant for syncope. Past Medical History:   Diagnosis Date    Anxiety     Managed with meds     Chronic fatigue 3/17/2016    Chronic pain of left knee 2016    COVID-19 vaccine series completed 2021    Moderna vaccine     Headache in front of head 3/17/2016    3/15/16 States that her headache pain is always in the right side of her forehead over her right eye.     Insomnia, persistent 3/17/2016    Iron deficiency anemia 3/17/2016    Palpitations     Prediabetes 2016    Vitamin D deficiency 3/17/2016    3/15/16 Vit D Level 15.4        Past Surgical History:   Procedure Laterality Date    HX GYN      D&C 2009          Family History:   Problem Relation Age of Onset    Hypertension Mother     Hypertension Maternal Grandmother     Breast Cancer Maternal Grandmother     No Known Problems Father     No Known Problems Sister     No Known Problems Brother     Cancer Other         Maternal Great GM-Breast and Cervical/Ovarian cancer    Breast Cancer Other         mat great grand mother    No Known Problems Sister        Social History     Socioeconomic History    Marital status:      Spouse name: Shelley Linton    Number of children: 2    Years of education: Not on file    Highest education level: Not on file   Occupational History    Occupation: DSS-HS Assistant   Tobacco Use    Smoking status: Never Smoker    Smokeless tobacco: Never Used   Vaping Use    Vaping Use: Never used   Substance and Sexual Activity    Alcohol use: Yes     Alcohol/week: 0.0 standard drinks     Comment: socially    Drug use: No    Sexual activity: Yes     Partners: Female   Other Topics Concern    Not on file   Social History Narrative    Dee Dee Fairbanks has been  to [facility] since 2004 and she is the Mother of Gus Barros (2/12/94) and Chloe (8/12/98). She does not smoke and drinks alcohol socially. 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 She is also going to school to get a degree in Social Work and she would eventually like to treat people with addictions at [employer] like the [employer]. Social Determinants of Health     Financial Resource Strain:     Difficulty of Paying Living Expenses:    Food Insecurity:     Worried About Running Out of Food in the Last Year:     [address] in the Last Year:    Transportation Needs:     Lack of Transportation (Medical):      Lack of Transportation (Non-Medical):    Physical Activity:     Days of Exercise per Week:     Minutes of Exercise per Session:    Stress:     Feeling of Stress :    Social Connections:     Frequency of Communication with Friends and Family:     Frequency of Social Gatherings with Friends and Family:     Attends [employer] Services:     Active Member of Clubs or Organizations:     Attends Club or Organization Meetings:     Marital Status:    Intimate Partner Violence:     Fear of Current or Ex-Partner:     Emotionally Abused:     Physically Abused:     Sexually Abused: ALLERGIES: Lortab [hydrocodone-acetaminophen]    Review of Systems   Constitutional: Positive for fatigue. Negative for chills and fever. Respiratory: Positive for shortness of breath. Negative for apnea, cough, chest tightness, wheezing and stridor. Cardiovascular: Positive for syncope. Negative for chest pain and palpitations. Gastrointestinal: Negative for abdominal pain, diarrhea, nausea and vomiting. Skin: Negative for color change, pallor and rash. Neurological: Positive for syncope. Negative for dizziness, tremors, seizures, facial asymmetry, speech difficulty, weakness, light-headedness, numbness and headaches. All other systems reviewed and are negative. Vitals:    08/21/21 2118 08/21/21 2253 08/21/21 2254 08/21/21 2257   BP: 128/62 127/60 125/67 120/63   Pulse: 88 74 80 95   Resp: 18      Temp: 98.8 °F (37.1 °C)      SpO2: 100%      Weight: 123.8 kg (273 lb)               Physical Exam  Vitals and nursing note reviewed. Constitutional:       General: She is not in acute distress. Appearance: Normal appearance. She is well-developed. She is not ill-appearing, toxic-appearing or diaphoretic. HENT:      Head: Normocephalic and atraumatic. Eyes:      General: No scleral icterus. Conjunctiva/sclera: Conjunctivae normal.   Neck:      Thyroid: No thyromegaly. Cardiovascular:      Rate and Rhythm: Normal rate and regular rhythm. Pulmonary:      Effort: Pulmonary effort is normal. No tachypnea, accessory muscle usage or respiratory distress. Breath sounds: No stridor. No decreased breath sounds, wheezing, rhonchi or rales. Chest:      Chest wall: No tenderness. Abdominal:      Palpations: Abdomen is soft. Tenderness: There is no abdominal tenderness. There is no guarding or rebound.    Musculoskeletal:      Cervical back: Normal range of motion. No rigidity. Skin:     Capillary Refill: Capillary refill takes less than 2 seconds. Neurological:      General: No focal deficit present. Mental Status: She is alert and oriented to person, place, and time. Mental status is at baseline. Cranial Nerves: No cranial nerve deficit. Sensory: No sensory deficit. Psychiatric:         Mood and Affect: Mood normal. Mood is not anxious. Behavior: Behavior normal. Behavior is not agitated. MDM  Number of Diagnoses or Management Options  Anemia, unspecified type  Syncope and collapse  Diagnosis management comments: Better has received a unit she was in the department for a long time as it took a while to get her blood matched as she is not bleeding currently we did 1 unit transfusion and will have her follow-up as an outpatient. Anemia likely contributed to lightheadedness weakness and syncope. I am rewriting iron prescription to go home with. Sergio Solis MD; 8/22/2021 @4:24 AM Voice dictation software was used during the making of this note. This software is not perfect and grammatical and other typographical errors may be present.   This note has not been proofread for errors.  ===================================================================          Amount and/or Complexity of Data Reviewed  Clinical lab tests: ordered and reviewed (Results for orders placed or performed during the hospital encounter of 08/21/21  -CBC WITH AUTOMATED DIFF       Result                      Value             Ref Range           WBC                         9.7               4.3 - 11.1 K*       RBC                         3.47 (L)          4.05 - 5.2 M*       HGB                         7.3 (L)           11.7 - 15.4 *       HCT                         25.5 (L)          35.8 - 46.3 %       MCV                         73.5 (L)          79.6 - 97.8 *       MCH                         21.0 (L)          26.1 - 32.9 *       MCHC 28.6 (L)          31.4 - 35.0 *       RDW                         17.9 (H)          11.9 - 14.6 %       PLATELET                    496 (H)           150 - 450 K/*       MPV                         10.3              9.4 - 12.3 FL       ABSOLUTE NRBC               0.00              0.0 - 0.2 K/*       DF                          AUTOMATED                             NEUTROPHILS                 70                43 - 78 %           LYMPHOCYTES                 20                13 - 44 %           MONOCYTES                   8                 4.0 - 12.0 %        EOSINOPHILS                 2                 0.5 - 7.8 %         BASOPHILS                   1                 0.0 - 2.0 %         IMMATURE GRANULOCYTES       0                 0.0 - 5.0 %         ABS. NEUTROPHILS            6.7               1.7 - 8.2 K/*       ABS. LYMPHOCYTES            1.9               0.5 - 4.6 K/*       ABS. MONOCYTES              0.7               0.1 - 1.3 K/*       ABS. EOSINOPHILS            0.2               0.0 - 0.8 K/*       ABS. BASOPHILS              0.1               0.0 - 0.2 K/*       ABS. IMM.  GRANS.            0.0               0.0 - 0.5 K/*  -METABOLIC PANEL, COMPREHENSIVE       Result                      Value             Ref Range           Sodium                      139               136 - 145 mm*       Potassium                   3.9               3.5 - 5.1 mm*       Chloride                    107               98 - 107 mmo*       CO2                         27                21 - 32 mmol*       Anion gap                   5 (L)             7 - 16 mmol/L       Glucose                     112 (H)           65 - 100 mg/*       BUN                         9                 6 - 23 MG/DL        Creatinine                  0.89              0.6 - 1.0 MG*       GFR est AA                  >60               >60 ml/min/1*       GFR est non-AA              >60               >60 ml/min/1*       Calcium 8.4               8.3 - 10.4 M*       Bilirubin, total            0.2               0.2 - 1.1 MG*       ALT (SGPT)                  18                12 - 65 U/L         AST (SGOT)                  18                15 - 37 U/L         Alk.  phosphatase            55                50 - 136 U/L        Protein, total              7.4               6.3 - 8.2 g/*       Albumin                     3.2 (L)           3.5 - 5.0 g/*       Globulin                    4.2 (H)           2.3 - 3.5 g/*       A-G Ratio                   0.8 (L)           1.2 - 3.5      -EKG, 12 LEAD, INITIAL       Result                      Value             Ref Range           Ventricular Rate            90                BPM                 Atrial Rate                 90                BPM                 P-R Interval                166               ms                  QRS Duration                78                ms                  Q-T Interval                352               ms                  QTC Calculation (Bezet)     430               ms                  Calculated P Axis           53                degrees             Calculated R Axis           33                degrees             Calculated T Axis           43                degrees             Diagnosis                                                     Normal sinus rhythm   Possible Left atrial enlargement   Borderline ECG   No previous ECGs available     -TYPE & SCREEN       Result                      Value             Ref Range           Crossmatch Expiration                                         08/24/2021,2359       ABO/Rh(D)                   AB POSITIVE                           Antibody screen             NEG                                   Unit number                 [id number]                         Blood component type        RC LR                                 Unit division               00                                    Status of unit ISSUED                                Crossmatch result           Compatible                       -RBC, ALLOCATE       Result                      Value             Ref Range           HISTORY CHECKED?                                               Historical check performed )           Procedures

## 2021-08-31 ENCOUNTER — HOSPITAL ENCOUNTER (OUTPATIENT)
Dept: LAB | Age: 41
Discharge: HOME | End: 2021-08-31
Payer: COMMERCIAL

## 2021-08-31 DIAGNOSIS — D50.0 IRON DEFICIENCY ANEMIA DUE TO CHRONIC BLOOD LOSS: ICD-10-CM

## 2021-08-31 LAB
FERRITIN SERPL-MCNC: 21 NG/ML (ref 8–388)
IRON SATN MFR SERPL: 3 %
IRON SERPL-MCNC: 13 UG/DL (ref 35–150)
TIBC SERPL-MCNC: 388 UG/DL (ref 250–450)

## 2021-08-31 PROCEDURE — 82728 ASSAY OF FERRITIN: CPT

## 2021-08-31 PROCEDURE — 36415 COLL VENOUS BLD VENIPUNCTURE: CPT

## 2021-08-31 PROCEDURE — 83540 ASSAY OF IRON: CPT

## 2021-09-13 PROBLEM — N92.1 MENOMETRORRHAGIA: Status: ACTIVE | Noted: 2021-09-13

## 2021-10-14 ENCOUNTER — HOSPITAL ENCOUNTER (OUTPATIENT)
Dept: LAB | Age: 41
Discharge: HOME | End: 2021-10-14
Payer: COMMERCIAL

## 2021-10-14 DIAGNOSIS — D50.0 IRON DEFICIENCY ANEMIA DUE TO CHRONIC BLOOD LOSS: ICD-10-CM

## 2021-10-14 DIAGNOSIS — R53.82 CHRONIC FATIGUE: ICD-10-CM

## 2021-10-14 DIAGNOSIS — R77.1 HYPERGLOBULINEMIA: ICD-10-CM

## 2021-10-14 LAB
ALBUMIN SERPL-MCNC: 3.1 G/DL (ref 3.5–5)
ALBUMIN/GLOB SERPL: 0.7 {RATIO} (ref 1.2–3.5)
ALP SERPL-CCNC: 55 U/L (ref 50–136)
ALT SERPL-CCNC: 20 U/L (ref 12–65)
ANION GAP SERPL CALC-SCNC: 4 MMOL/L (ref 7–16)
AST SERPL-CCNC: 13 U/L (ref 15–37)
BASOPHILS # BLD: 0.1 K/UL (ref 0–0.2)
BASOPHILS NFR BLD: 1 % (ref 0–2)
BILIRUB SERPL-MCNC: 0.2 MG/DL (ref 0.2–1.1)
BUN SERPL-MCNC: 11 MG/DL (ref 6–23)
CALCIUM SERPL-MCNC: 8.9 MG/DL (ref 8.3–10.4)
CHLORIDE SERPL-SCNC: 108 MMOL/L (ref 98–107)
CO2 SERPL-SCNC: 28 MMOL/L (ref 21–32)
CREAT SERPL-MCNC: 0.8 MG/DL (ref 0.6–1)
DAT POLY-SP REAG RBC QL: NORMAL
DIFFERENTIAL METHOD BLD: ABNORMAL
EOSINOPHIL # BLD: 0.3 K/UL (ref 0–0.8)
EOSINOPHIL NFR BLD: 4 % (ref 0.5–7.8)
ERYTHROCYTE [DISTWIDTH] IN BLOOD BY AUTOMATED COUNT: 19 % (ref 11.9–14.6)
FERRITIN SERPL-MCNC: 12 NG/ML (ref 8–388)
FOLATE SERPL-MCNC: 14.6 NG/ML (ref 3.1–17.5)
GLOBULIN SER CALC-MCNC: 4.2 G/DL (ref 2.3–3.5)
GLUCOSE SERPL-MCNC: 99 MG/DL (ref 65–100)
HAPTOGLOB SERPL-MCNC: 193 MG/DL (ref 30–200)
HCT VFR BLD AUTO: 32.7 % (ref 35.8–46.3)
HGB BLD-MCNC: 9.4 G/DL (ref 11.7–15.4)
HGB RETIC QN AUTO: 23 PG (ref 29–35)
IMM GRANULOCYTES # BLD AUTO: 0 K/UL (ref 0–0.5)
IMM GRANULOCYTES NFR BLD AUTO: 0 % (ref 0–5)
IMM RETICS NFR: 21.4 % (ref 3–15.9)
IRON SATN MFR SERPL: 5 %
IRON SERPL-MCNC: 19 UG/DL (ref 35–150)
LDH SERPL L TO P-CCNC: 153 U/L (ref 100–190)
LYMPHOCYTES # BLD: 3.2 K/UL (ref 0.5–4.6)
LYMPHOCYTES NFR BLD: 40 % (ref 13–44)
MCH RBC QN AUTO: 21.2 PG (ref 26.1–32.9)
MCHC RBC AUTO-ENTMCNC: 28.7 G/DL (ref 31.4–35)
MCV RBC AUTO: 73.6 FL (ref 79.6–97.8)
MONOCYTES # BLD: 0.6 K/UL (ref 0.1–1.3)
MONOCYTES NFR BLD: 7 % (ref 4–12)
NEUTS SEG # BLD: 3.8 K/UL (ref 1.7–8.2)
NEUTS SEG NFR BLD: 48 % (ref 43–78)
NRBC # BLD: 0 K/UL (ref 0–0.2)
PLATELET # BLD AUTO: 386 K/UL (ref 150–450)
PMV BLD AUTO: 10 FL (ref 9.4–12.3)
POTASSIUM SERPL-SCNC: 4.2 MMOL/L (ref 3.5–5.1)
PROT SERPL-MCNC: 7.3 G/DL (ref 6.3–8.2)
RBC # BLD AUTO: 4.44 M/UL (ref 4.05–5.2)
RETICS # AUTO: 0.05 M/UL (ref 0.03–0.1)
RETICS/RBC NFR AUTO: 1.2 % (ref 0.3–2)
SODIUM SERPL-SCNC: 140 MMOL/L (ref 136–145)
TIBC SERPL-MCNC: 357 UG/DL (ref 250–450)
VIT B12 SERPL-MCNC: 554 PG/ML (ref 193–986)
WBC # BLD AUTO: 8 K/UL (ref 4.3–11.1)

## 2021-10-14 PROCEDURE — 85046 RETICYTE/HGB CONCENTRATE: CPT

## 2021-10-14 PROCEDURE — 82784 ASSAY IGA/IGD/IGG/IGM EACH: CPT

## 2021-10-14 PROCEDURE — 83615 LACTATE (LD) (LDH) ENZYME: CPT

## 2021-10-14 PROCEDURE — 83010 ASSAY OF HAPTOGLOBIN QUANT: CPT

## 2021-10-14 PROCEDURE — 36415 COLL VENOUS BLD VENIPUNCTURE: CPT

## 2021-10-14 PROCEDURE — 83883 ASSAY NEPHELOMETRY NOT SPEC: CPT

## 2021-10-14 PROCEDURE — 82746 ASSAY OF FOLIC ACID SERUM: CPT

## 2021-10-14 PROCEDURE — 80053 COMPREHEN METABOLIC PANEL: CPT

## 2021-10-14 PROCEDURE — 86334 IMMUNOFIX E-PHORESIS SERUM: CPT

## 2021-10-14 PROCEDURE — 86880 COOMBS TEST DIRECT: CPT

## 2021-10-14 PROCEDURE — 85025 COMPLETE CBC W/AUTO DIFF WBC: CPT

## 2021-10-14 PROCEDURE — 82728 ASSAY OF FERRITIN: CPT

## 2021-10-14 PROCEDURE — 82607 VITAMIN B-12: CPT

## 2021-10-14 PROCEDURE — 83550 IRON BINDING TEST: CPT

## 2021-10-15 LAB
KAPPA LC FREE SER-MCNC: 20.73 MG/L (ref 3.3–19.4)
KAPPA LC FREE/LAMBDA FREE SER: 0.98 {RATIO} (ref 0.26–1.65)
LAMBDA LC FREE SERPL-MCNC: 21.26 MG/L (ref 5.71–26.3)

## 2021-10-18 LAB
ALBUMIN SERPL ELPH-MCNC: 3.43 G/DL (ref 3.2–5.6)
ALBUMIN/GLOB SERPL: 1 {RATIO}
ALPHA1 GLOB SERPL ELPH-MCNC: 0.27 G/DL (ref 0.1–0.4)
ALPHA2 GLOB SERPL ELPH-MCNC: 0.81 G/DL (ref 0.4–1.2)
B-GLOBULIN SERPL QL ELPH: 1.18 G/DL (ref 0.6–1.3)
GAMMA GLOB MFR SERPL ELPH: 1.3 G/DL (ref 0.5–1.6)
IGA SERPL-MCNC: 234 MG/DL (ref 85–499)
IGG SERPL-MCNC: 1428 MG/DL (ref 610–1616)
IGM SERPL-MCNC: 46 MG/DL (ref 35–242)
M PROTEIN SERPL ELPH-MCNC: NORMAL G/DL
PROT PATTERN SERPL ELPH-IMP: NORMAL
PROT PATTERN SPEC IFE-IMP: NORMAL
PROT SERPL-MCNC: 7 G/DL (ref 6.3–8.2)

## 2021-10-22 ENCOUNTER — HOSPITAL ENCOUNTER (OUTPATIENT)
Dept: INFUSION THERAPY | Age: 41
Discharge: HOME | End: 2021-10-22
Payer: COMMERCIAL

## 2021-10-22 VITALS
OXYGEN SATURATION: 100 % | HEART RATE: 71 BPM | DIASTOLIC BLOOD PRESSURE: 68 MMHG | RESPIRATION RATE: 18 BRPM | SYSTOLIC BLOOD PRESSURE: 110 MMHG | WEIGHT: 273.81 LBS | BODY MASS INDEX: 45.56 KG/M2 | TEMPERATURE: 97.9 F

## 2021-10-22 DIAGNOSIS — D50.0 IRON DEFICIENCY ANEMIA DUE TO CHRONIC BLOOD LOSS: Primary | ICD-10-CM

## 2021-10-22 PROCEDURE — 74011250636 HC RX REV CODE- 250/636: Performed by: INTERNAL MEDICINE

## 2021-10-22 PROCEDURE — 96365 THER/PROPH/DIAG IV INF INIT: CPT

## 2021-10-22 RX ORDER — DIPHENHYDRAMINE HYDROCHLORIDE 50 MG/ML
25 INJECTION, SOLUTION INTRAMUSCULAR; INTRAVENOUS AS NEEDED
Status: DISCONTINUED | OUTPATIENT
Start: 2021-10-22 | End: 2021-10-23 | Stop reason: HOSPADM

## 2021-10-22 RX ORDER — HYDROCORTISONE SODIUM SUCCINATE 100 MG/2ML
100 INJECTION, POWDER, FOR SOLUTION INTRAMUSCULAR; INTRAVENOUS AS NEEDED
Status: DISCONTINUED | OUTPATIENT
Start: 2021-10-22 | End: 2021-10-23 | Stop reason: HOSPADM

## 2021-10-22 RX ORDER — SODIUM CHLORIDE 0.9 % (FLUSH) 0.9 %
10 SYRINGE (ML) INJECTION AS NEEDED
Status: DISCONTINUED | OUTPATIENT
Start: 2021-10-22 | End: 2021-10-23 | Stop reason: HOSPADM

## 2021-10-22 RX ORDER — SODIUM CHLORIDE 9 MG/ML
25 INJECTION, SOLUTION INTRAVENOUS CONTINUOUS
Status: DISCONTINUED | OUTPATIENT
Start: 2021-10-22 | End: 2021-10-23 | Stop reason: HOSPADM

## 2021-10-22 RX ORDER — ONDANSETRON 2 MG/ML
8 INJECTION INTRAMUSCULAR; INTRAVENOUS AS NEEDED
Status: DISCONTINUED | OUTPATIENT
Start: 2021-10-22 | End: 2021-10-23 | Stop reason: HOSPADM

## 2021-10-22 RX ADMIN — SODIUM CHLORIDE 25 ML/HR: 900 INJECTION, SOLUTION INTRAVENOUS at 16:50

## 2021-10-22 RX ADMIN — FERRIC CARBOXYMALTOSE INJECTION 750 MG: 50 INJECTION, SOLUTION INTRAVENOUS at 17:15

## 2021-10-22 RX ADMIN — Medication 10 ML: at 16:50

## 2021-10-22 NOTE — PROGRESS NOTES
Tolerated Injectafer infusion without difficulty. Observed x 30 minutes post infusion without reaction. Patient discharged via ambulation accompanied by self. Instructed to notify physician of any problems, questions or concerns after discharge. Next appointment is 10/29/2021 at 042 [id number] with Infusion.

## 2021-10-22 NOTE — PROGRESS NOTES
Reviewed Injectafer infusion with patient. Teaching handout given and possible side effects reviewed. Verbalizes understanding.

## 2021-10-29 ENCOUNTER — HOSPITAL ENCOUNTER (OUTPATIENT)
Dept: INFUSION THERAPY | Age: 41
Discharge: HOME | End: 2021-10-29
Payer: COMMERCIAL

## 2021-10-29 VITALS
RESPIRATION RATE: 18 BRPM | OXYGEN SATURATION: 99 % | SYSTOLIC BLOOD PRESSURE: 137 MMHG | DIASTOLIC BLOOD PRESSURE: 85 MMHG | TEMPERATURE: 98.1 F | HEART RATE: 77 BPM

## 2021-10-29 DIAGNOSIS — D50.0 IRON DEFICIENCY ANEMIA DUE TO CHRONIC BLOOD LOSS: Primary | ICD-10-CM

## 2021-10-29 PROCEDURE — 96365 THER/PROPH/DIAG IV INF INIT: CPT

## 2021-10-29 PROCEDURE — 74011250636 HC RX REV CODE- 250/636: Performed by: INTERNAL MEDICINE

## 2021-10-29 RX ORDER — SODIUM CHLORIDE 0.9 % (FLUSH) 0.9 %
10 SYRINGE (ML) INJECTION AS NEEDED
Status: DISCONTINUED | OUTPATIENT
Start: 2021-10-29 | End: 2021-10-30 | Stop reason: HOSPADM

## 2021-10-29 RX ORDER — SODIUM CHLORIDE 9 MG/ML
25 INJECTION, SOLUTION INTRAVENOUS CONTINUOUS
Status: DISCONTINUED | OUTPATIENT
Start: 2021-10-29 | End: 2021-10-30 | Stop reason: HOSPADM

## 2021-10-29 RX ADMIN — Medication 10 ML: at 16:27

## 2021-10-29 RX ADMIN — SODIUM CHLORIDE 25 ML/HR: 9 INJECTION, SOLUTION INTRAVENOUS at 16:27

## 2021-10-29 RX ADMIN — FERRIC CARBOXYMALTOSE INJECTION 750 MG: 50 INJECTION, SOLUTION INTRAVENOUS at 16:38

## 2021-10-29 NOTE — PROGRESS NOTES
Arrived to the [facility]. Injectafer completed. Patient tolerated well. Any issues or concerns during appointment: none. Discharged ambulatory.

## 2022-01-21 ENCOUNTER — HOSPITAL ENCOUNTER (OUTPATIENT)
Dept: LAB | Age: 42
Discharge: HOME | End: 2022-01-21
Payer: COMMERCIAL

## 2022-01-21 DIAGNOSIS — D50.0 IRON DEFICIENCY ANEMIA DUE TO CHRONIC BLOOD LOSS: ICD-10-CM

## 2022-01-21 LAB
ALBUMIN SERPL-MCNC: 3 G/DL (ref 3.5–5)
ALBUMIN/GLOB SERPL: 0.8 {RATIO} (ref 1.2–3.5)
ALP SERPL-CCNC: 54 U/L (ref 50–136)
ALT SERPL-CCNC: 21 U/L (ref 12–65)
ANION GAP SERPL CALC-SCNC: 6 MMOL/L (ref 7–16)
AST SERPL-CCNC: 15 U/L (ref 15–37)
BASOPHILS # BLD: 0 K/UL (ref 0–0.2)
BASOPHILS NFR BLD: 1 % (ref 0–2)
BILIRUB SERPL-MCNC: 0.4 MG/DL (ref 0.2–1.1)
BUN SERPL-MCNC: 12 MG/DL (ref 6–23)
CALCIUM SERPL-MCNC: 8.6 MG/DL (ref 8.3–10.4)
CHLORIDE SERPL-SCNC: 106 MMOL/L (ref 98–107)
CO2 SERPL-SCNC: 26 MMOL/L (ref 21–32)
CREAT SERPL-MCNC: 0.9 MG/DL (ref 0.6–1)
DIFFERENTIAL METHOD BLD: ABNORMAL
EOSINOPHIL # BLD: 0.2 K/UL (ref 0–0.8)
EOSINOPHIL NFR BLD: 4 % (ref 0.5–7.8)
ERYTHROCYTE [DISTWIDTH] IN BLOOD BY AUTOMATED COUNT: 15.9 % (ref 11.9–14.6)
FERRITIN SERPL-MCNC: 312 NG/ML (ref 8–388)
GLOBULIN SER CALC-MCNC: 4 G/DL (ref 2.3–3.5)
GLUCOSE SERPL-MCNC: 88 MG/DL (ref 65–100)
HCT VFR BLD AUTO: 38.6 % (ref 35.8–46.3)
HGB BLD-MCNC: 11.9 G/DL (ref 11.7–15.4)
IMM GRANULOCYTES # BLD AUTO: 0 K/UL (ref 0–0.5)
IMM GRANULOCYTES NFR BLD AUTO: 0 % (ref 0–5)
IRON SATN MFR SERPL: 32 %
IRON SERPL-MCNC: 80 UG/DL (ref 35–150)
LYMPHOCYTES # BLD: 2.8 K/UL (ref 0.5–4.6)
LYMPHOCYTES NFR BLD: 44 % (ref 13–44)
MCH RBC QN AUTO: 26.1 PG (ref 26.1–32.9)
MCHC RBC AUTO-ENTMCNC: 30.8 G/DL (ref 31.4–35)
MCV RBC AUTO: 84.6 FL (ref 79.6–97.8)
MONOCYTES # BLD: 0.5 K/UL (ref 0.1–1.3)
MONOCYTES NFR BLD: 7 % (ref 4–12)
NEUTS SEG # BLD: 2.8 K/UL (ref 1.7–8.2)
NEUTS SEG NFR BLD: 44 % (ref 43–78)
NRBC # BLD: 0 K/UL (ref 0–0.2)
PLATELET # BLD AUTO: 278 K/UL (ref 150–450)
PMV BLD AUTO: 10 FL (ref 9.4–12.3)
POTASSIUM SERPL-SCNC: 4 MMOL/L (ref 3.5–5.1)
PROT SERPL-MCNC: 7 G/DL (ref 6.3–8.2)
RBC # BLD AUTO: 4.56 M/UL (ref 4.05–5.2)
SODIUM SERPL-SCNC: 138 MMOL/L (ref 136–145)
TIBC SERPL-MCNC: 253 UG/DL (ref 250–450)
WBC # BLD AUTO: 6.3 K/UL (ref 4.3–11.1)

## 2022-01-21 PROCEDURE — 80053 COMPREHEN METABOLIC PANEL: CPT

## 2022-01-21 PROCEDURE — 36415 COLL VENOUS BLD VENIPUNCTURE: CPT

## 2022-01-21 PROCEDURE — 82728 ASSAY OF FERRITIN: CPT

## 2022-01-21 PROCEDURE — 83540 ASSAY OF IRON: CPT

## 2022-01-21 PROCEDURE — 85025 COMPLETE CBC W/AUTO DIFF WBC: CPT

## 2022-02-23 ENCOUNTER — TRANSCRIBE ORDER (OUTPATIENT)
Dept: SCHEDULING | Age: 42
End: 2022-02-23

## 2022-02-23 DIAGNOSIS — Z12.31 ENCOUNTER FOR SCREENING MAMMOGRAM FOR MALIGNANT NEOPLASM OF BREAST: Primary | ICD-10-CM

## 2022-02-26 ENCOUNTER — HOSPITAL ENCOUNTER (OUTPATIENT)
Dept: MAMMOGRAPHY | Age: 42
Discharge: HOME | End: 2022-02-26
Attending: FAMILY MEDICINE
Payer: COMMERCIAL

## 2022-02-26 DIAGNOSIS — Z12.31 ENCOUNTER FOR SCREENING MAMMOGRAM FOR MALIGNANT NEOPLASM OF BREAST: ICD-10-CM

## 2022-02-26 PROCEDURE — 77063 BREAST TOMOSYNTHESIS BI: CPT

## 2022-03-19 PROBLEM — N92.1 MENOMETRORRHAGIA: Status: ACTIVE | Noted: 2021-09-13

## 2022-03-20 PROBLEM — E66.01 OBESITY, MORBID (HCC): Status: ACTIVE | Noted: 2017-12-15

## 2022-04-14 ENCOUNTER — HOSPITAL ENCOUNTER (OUTPATIENT)
Dept: LAB | Age: 42
Discharge: HOME | End: 2022-04-14
Payer: COMMERCIAL

## 2022-04-14 DIAGNOSIS — D50.0 IRON DEFICIENCY ANEMIA DUE TO CHRONIC BLOOD LOSS: ICD-10-CM

## 2022-04-14 LAB
ALBUMIN SERPL-MCNC: 3.5 G/DL (ref 3.5–5)
ALBUMIN/GLOB SERPL: 0.9 {RATIO} (ref 1.2–3.5)
ALP SERPL-CCNC: 53 U/L (ref 50–136)
ALT SERPL-CCNC: 21 U/L (ref 12–65)
ANION GAP SERPL CALC-SCNC: 5 MMOL/L (ref 7–16)
AST SERPL-CCNC: 17 U/L (ref 15–37)
BASOPHILS # BLD: 0.1 K/UL (ref 0–0.2)
BASOPHILS NFR BLD: 1 % (ref 0–2)
BILIRUB SERPL-MCNC: 0.3 MG/DL (ref 0.2–1.1)
BUN SERPL-MCNC: 11 MG/DL (ref 6–23)
CALCIUM SERPL-MCNC: 9.2 MG/DL (ref 8.3–10.4)
CHLORIDE SERPL-SCNC: 106 MMOL/L (ref 98–107)
CO2 SERPL-SCNC: 27 MMOL/L (ref 21–32)
CREAT SERPL-MCNC: 1 MG/DL (ref 0.6–1)
DIFFERENTIAL METHOD BLD: ABNORMAL
EOSINOPHIL # BLD: 0.2 K/UL (ref 0–0.8)
EOSINOPHIL NFR BLD: 3 % (ref 0.5–7.8)
ERYTHROCYTE [DISTWIDTH] IN BLOOD BY AUTOMATED COUNT: 13.2 % (ref 11.9–14.6)
FERRITIN SERPL-MCNC: 272 NG/ML (ref 8–388)
GLOBULIN SER CALC-MCNC: 3.9 G/DL (ref 2.3–3.5)
GLUCOSE SERPL-MCNC: 104 MG/DL (ref 65–100)
HCT VFR BLD AUTO: 39.7 % (ref 35.8–46.3)
HGB BLD-MCNC: 12.2 G/DL (ref 11.7–15.4)
IMM GRANULOCYTES # BLD AUTO: 0 K/UL (ref 0–0.5)
IMM GRANULOCYTES NFR BLD AUTO: 0 % (ref 0–5)
IRON SATN MFR SERPL: 16 %
IRON SERPL-MCNC: 44 UG/DL (ref 35–150)
LYMPHOCYTES # BLD: 2.7 K/UL (ref 0.5–4.6)
LYMPHOCYTES NFR BLD: 46 % (ref 13–44)
MCH RBC QN AUTO: 26.6 PG (ref 26.1–32.9)
MCHC RBC AUTO-ENTMCNC: 30.7 G/DL (ref 31.4–35)
MCV RBC AUTO: 86.5 FL (ref 79.6–97.8)
MONOCYTES # BLD: 0.5 K/UL (ref 0.1–1.3)
MONOCYTES NFR BLD: 8 % (ref 4–12)
NEUTS SEG # BLD: 2.5 K/UL (ref 1.7–8.2)
NEUTS SEG NFR BLD: 42 % (ref 43–78)
NRBC # BLD: 0 K/UL (ref 0–0.2)
PLATELET # BLD AUTO: 328 K/UL (ref 150–450)
PMV BLD AUTO: 10.1 FL (ref 9.4–12.3)
POTASSIUM SERPL-SCNC: 4.3 MMOL/L (ref 3.5–5.1)
PROT SERPL-MCNC: 7.4 G/DL (ref 6.3–8.2)
RBC # BLD AUTO: 4.59 M/UL (ref 4.05–5.2)
SODIUM SERPL-SCNC: 138 MMOL/L (ref 136–145)
TIBC SERPL-MCNC: 277 UG/DL (ref 250–450)
WBC # BLD AUTO: 6 K/UL (ref 4.3–11.1)

## 2022-04-14 PROCEDURE — 85025 COMPLETE CBC W/AUTO DIFF WBC: CPT

## 2022-04-14 PROCEDURE — 36415 COLL VENOUS BLD VENIPUNCTURE: CPT

## 2022-04-14 PROCEDURE — 83540 ASSAY OF IRON: CPT

## 2022-04-14 PROCEDURE — 80053 COMPREHEN METABOLIC PANEL: CPT

## 2022-04-14 PROCEDURE — 82728 ASSAY OF FERRITIN: CPT

## 2022-06-13 ENCOUNTER — TELEMEDICINE (OUTPATIENT)
Dept: FAMILY MEDICINE CLINIC | Facility: CLINIC | Age: 42
End: 2022-06-13
Payer: COMMERCIAL

## 2022-06-13 DIAGNOSIS — N30.01 ACUTE CYSTITIS WITH HEMATURIA: Primary | ICD-10-CM

## 2022-06-13 PROCEDURE — 99213 OFFICE O/P EST LOW 20 MIN: CPT | Performed by: FAMILY MEDICINE

## 2022-06-13 RX ORDER — CIPROFLOXACIN 500 MG/1
500 TABLET, FILM COATED ORAL 2 TIMES DAILY
Qty: 10 TABLET | Refills: 0 | Status: SHIPPED | OUTPATIENT
Start: 2022-06-13 | End: 2022-06-18

## 2022-06-13 RX ORDER — FLUCONAZOLE 150 MG/1
150 TABLET ORAL ONCE
Qty: 1 TABLET | Refills: 0 | Status: SHIPPED | OUTPATIENT
Start: 2022-06-13 | End: 2022-06-13

## 2022-06-13 RX ORDER — PHENAZOPYRIDINE HYDROCHLORIDE 200 MG/1
200 TABLET, FILM COATED ORAL 3 TIMES DAILY PRN
Qty: 9 TABLET | Refills: 0 | Status: SHIPPED | OUTPATIENT
Start: 2022-06-13 | End: 2022-06-16

## 2022-06-13 ASSESSMENT — PATIENT HEALTH QUESTIONNAIRE - PHQ9
SUM OF ALL RESPONSES TO PHQ QUESTIONS 1-9: 0
SUM OF ALL RESPONSES TO PHQ9 QUESTIONS 1 & 2: 0
SUM OF ALL RESPONSES TO PHQ QUESTIONS 1-9: 0
SUM OF ALL RESPONSES TO PHQ QUESTIONS 1-9: 0
2. FEELING DOWN, DEPRESSED OR HOPELESS: 0
1. LITTLE INTEREST OR PLEASURE IN DOING THINGS: 0
SUM OF ALL RESPONSES TO PHQ QUESTIONS 1-9: 0

## 2022-06-13 ASSESSMENT — ENCOUNTER SYMPTOMS
COUGH: 0
SHORTNESS OF BREATH: 0
VOMITING: 0
DIARRHEA: 0
CONSTIPATION: 0
NAUSEA: 1

## 2022-06-13 NOTE — PROGRESS NOTES
Mitchell Pyle (:  1980) is a Established patient, here for evaluation of the following:    Assessment & Plan   Below is the assessment and plan developed based on review of pertinent history, physical exam, labs, studies, and medications. 1. Acute cystitis with hematuria  -     phenazopyridine (PYRIDIUM) 200 MG tablet; Take 1 tablet by mouth 3 times daily as needed for Pain, Disp-9 tablet, R-0Normal  -     ciprofloxacin (CIPRO) 500 mg tablet; Take 1 tablet by mouth 2 times daily for 5 days, Disp-10 tablet, R-0Normal  return for iov or uc if symptoms persistent or worsening. No follow-ups on file. Subjective   HPI  Chief Complaint   Patient presents with    Urinary Frequency     Feels like she has a UTI    Urinary Pain     Has been seeing some blood today. yesterday felt nauseous and had some vomiting. This morning had discomfort at the end of urination and then had blood in her urine today. No vomiting today but does feel queasy. No fever or sweats or chills. Denies back pain. Review of Systems   Constitutional: Negative for diaphoresis and unexpected weight change. HENT: Negative for congestion. Eyes: Negative for visual disturbance. Respiratory: Negative for cough and shortness of breath. Cardiovascular: Negative for chest pain. Gastrointestinal: Positive for nausea. Negative for constipation, diarrhea and vomiting. Genitourinary: Positive for dysuria and frequency. Negative for difficulty urinating, flank pain and pelvic pain. Neurological: Negative for headaches. Psychiatric/Behavioral: Negative for dysphoric mood and sleep disturbance. The patient is not nervous/anxious.            Objective     Physical Exam  [INSTRUCTIONS:  \"[x]\" Indicates a positive item  \"[]\" Indicates a negative item  -- DELETE ALL ITEMS NOT EXAMINED]    Constitutional: [x] Appears well-developed and well-nourished [x] No apparent distress      [] Abnormal -     Mental status: [x] Alert and awake  [x] Oriented to person/place/time [x] Able to follow commands    [] Abnormal -     Eyes:   EOM    [x]  Normal    [] Abnormal -   Sclera  [x]  Normal    [] Abnormal -          Discharge [x]  None visible   [] Abnormal -     HENT: [x] Normocephalic, atraumatic  [] Abnormal -   [x] Mouth/Throat: Mucous membranes are moist    External Ears [x] Normal  [] Abnormal -    Neck: [x] No visualized mass [] Abnormal -     Pulmonary/Chest: [x] Respiratory effort normal   [x] No visualized signs of difficulty breathing or respiratory distress        [] Abnormal -      Musculoskeletal:   [x] Normal gait with no signs of ataxia         [x] Normal range of motion of neck        [] Abnormal -     Neurological:        [x] No Facial Asymmetry (Cranial nerve 7 motor function) (limited exam due to video visit)          [x] No gaze palsy        [] Abnormal -          Skin:        [x] No significant exanthematous lesions or discoloration noted on facial skin         [] Abnormal -            Psychiatric:       [x] Normal Affect [] Abnormal -        [x] No Hallucinations    Other pertinent observable physical exam findings:-           Patient-Reported Vitals 6/13/2022   Patient-Reported Weight 275      Patient-Reported Vitals  Patient-Reported Weight: [address], was evaluated through a synchronous (real-time) audio-video encounter. The patient (or guardian if applicable) is aware that this is a billable service, which includes applicable co-pays. This Virtual Visit was conducted with patient's (and/or legal guardian's) consent. The visit was conducted pursuant to the emergency declaration under the [address] [place] [address] authority and the [employer] General Act. Patient identification was verified, and a caregiver was present when appropriate.    The patient was located at Home: [address] [place] [id number].    Provider was located at Home ([facility]): Lazarus Mater, MD

## 2022-06-20 ENCOUNTER — TELEPHONE (OUTPATIENT)
Dept: FAMILY MEDICINE CLINIC | Facility: CLINIC | Age: 42
End: 2022-06-20

## 2022-06-20 ENCOUNTER — OFFICE VISIT (OUTPATIENT)
Dept: FAMILY MEDICINE CLINIC | Facility: CLINIC | Age: 42
End: 2022-06-20
Payer: COMMERCIAL

## 2022-06-20 VITALS
HEIGHT: 65 IN | OXYGEN SATURATION: 97 % | TEMPERATURE: 97.6 F | BODY MASS INDEX: 46.12 KG/M2 | HEART RATE: 78 BPM | WEIGHT: 276.8 LBS | SYSTOLIC BLOOD PRESSURE: 119 MMHG | DIASTOLIC BLOOD PRESSURE: 78 MMHG

## 2022-06-20 DIAGNOSIS — N30.01 ACUTE CYSTITIS WITH HEMATURIA: Primary | ICD-10-CM

## 2022-06-20 LAB
BILIRUBIN, URINE, POC: NEGATIVE
BLOOD URINE, POC: NORMAL
GLUCOSE URINE, POC: NEGATIVE
KETONES, URINE, POC: NEGATIVE
LEUKOCYTE ESTERASE, URINE, POC: NEGATIVE
NITRITE, URINE, POC: NEGATIVE
PH, URINE, POC: 5.5 (ref 4.6–8)
PROTEIN,URINE, POC: NEGATIVE
SPECIFIC GRAVITY, URINE, POC: 1.01 (ref 1–1.03)
URINALYSIS CLARITY, POC: CLEAR
URINALYSIS COLOR, POC: YELLOW
UROBILINOGEN, POC: NORMAL

## 2022-06-20 PROCEDURE — 99213 OFFICE O/P EST LOW 20 MIN: CPT | Performed by: FAMILY MEDICINE

## 2022-06-20 PROCEDURE — 81003 URINALYSIS AUTO W/O SCOPE: CPT | Performed by: FAMILY MEDICINE

## 2022-06-20 ASSESSMENT — PATIENT HEALTH QUESTIONNAIRE - PHQ9
SUM OF ALL RESPONSES TO PHQ QUESTIONS 1-9: 0
SUM OF ALL RESPONSES TO PHQ9 QUESTIONS 1 & 2: 0
2. FEELING DOWN, DEPRESSED OR HOPELESS: 0
1. LITTLE INTEREST OR PLEASURE IN DOING THINGS: 0

## 2022-06-20 ASSESSMENT — ANXIETY QUESTIONNAIRES
2. NOT BEING ABLE TO STOP OR CONTROL WORRYING: 0
4. TROUBLE RELAXING: 0
7. FEELING AFRAID AS IF SOMETHING AWFUL MIGHT HAPPEN: 0
3. WORRYING TOO MUCH ABOUT DIFFERENT THINGS: 0
IF YOU CHECKED OFF ANY PROBLEMS ON THIS QUESTIONNAIRE, HOW DIFFICULT HAVE THESE PROBLEMS MADE IT FOR YOU TO DO YOUR WORK, TAKE CARE OF THINGS AT HOME, OR GET ALONG WITH OTHER PEOPLE: NOT DIFFICULT AT ALL
1. FEELING NERVOUS, ANXIOUS, OR ON EDGE: 0
6. BECOMING EASILY ANNOYED OR IRRITABLE: 0
GAD7 TOTAL SCORE: 0
5. BEING SO RESTLESS THAT IT IS HARD TO SIT STILL: 0

## 2022-06-20 ASSESSMENT — ENCOUNTER SYMPTOMS
ABDOMINAL DISTENTION: 0
BACK PAIN: 0
NAUSEA: 0
VOMITING: 0

## 2022-06-20 NOTE — TELEPHONE ENCOUNTER
----- Message from Suzanne Baltazar sent at 6/20/2022  8:23 AM EDT -----  Subject: Message to Provider    QUESTIONS  Information for Provider? Patient was in on 6/13 for UTI symptoms, and was   given antibiotics, but she still isn't feeling right. Patient is still   having the urge to urinate all of the time, and is only having barely any   flow at all. She would like to know if she can come in and do a   urinalysis. ---------------------------------------------------------------------------  --------------  Betty GROVES  What is the best way for the office to contact you? OK to leave message on   voicemail  Preferred Call Back Phone Number? [phone]  ---------------------------------------------------------------------------  --------------  SCRIPT ANSWERS  Relationship to Patient? Self  Have you recently (14 days) seen a provider for this problem?  Yes

## 2022-06-20 NOTE — PROGRESS NOTES
Juan Daniel Bradford (:  1980) is a 38 y.o. female,Established patient, here for evaluation of the following chief complaint(s):  Follow-up (UTI)         ASSESSMENT/PLAN:  1. Acute cystitis with hematuria  -     AMB POC URINALYSIS DIP STICK AUTO W/O MICRO    UA looks good. Call back if symptoms worsen again. Ok to take azo for a day or so to see if it helps w/ urgency. No follow-ups on file. Subjective   SUBJECTIVE/OBJECTIVE:  HPI  Chief Complaint   Patient presents with    Follow-up     UTI     Took scipro starting last week for uti. She thought they were too strong and making her dizzy, so she asked to stop them but did not stop them. The burning with urination is gone, but still has frequency. She did take the entire 5 day course of cipro. Review of Systems   Gastrointestinal: Negative for abdominal distention, nausea and vomiting. Genitourinary: Positive for frequency. Negative for difficulty urinating, dysuria, menstrual problem, urgency and vaginal discharge. Musculoskeletal: Negative for back pain and myalgias. Neurological: Negative for dizziness. Objective   Physical Exam  Vitals reviewed. Constitutional:       Appearance: Normal appearance. She is not diaphoretic. Eyes:      Extraocular Movements: Extraocular movements intact. Conjunctiva/sclera: Conjunctivae normal.      Pupils: Pupils are equal, round, and reactive to light. Cardiovascular:      Rate and Rhythm: Normal rate and regular rhythm. Heart sounds: Normal heart sounds. Pulmonary:      Effort: Pulmonary effort is normal.      Breath sounds: Normal breath sounds. Abdominal:      General: Bowel sounds are normal. There is no distension. Tenderness: There is no abdominal tenderness. There is no right CVA tenderness or left CVA tenderness. Musculoskeletal:         General: Normal range of motion. Skin:     Findings: No rash.    Neurological:      General: No focal deficit present. Mental Status: She is alert. Psychiatric:         Mood and Affect: Mood normal.         Behavior: Behavior normal.         Thought Content: Thought content normal.     /78 (Site: Right Upper Arm, Position: Sitting, Cuff Size: Large Adult)   Pulse 78   Temp 97.6 °F (36.4 °C) (Temporal)   Ht 5' 5\" (1.651 m)   Wt 276 lb 12.8 oz (125.6 kg)   SpO2 97%   BMI 46.06 kg/m²          An electronic signature was used to authenticate this note.     --Otoniel Wharton MD

## 2022-08-08 ENCOUNTER — OFFICE VISIT (OUTPATIENT)
Dept: GYNECOLOGY | Age: 42
End: 2022-08-08
Payer: COMMERCIAL

## 2022-08-08 VITALS
SYSTOLIC BLOOD PRESSURE: 118 MMHG | HEIGHT: 65 IN | WEIGHT: 274 LBS | DIASTOLIC BLOOD PRESSURE: 86 MMHG | BODY MASS INDEX: 45.65 KG/M2

## 2022-08-08 DIAGNOSIS — N92.6 IRREGULAR PERIODS/MENSTRUAL CYCLES: Primary | ICD-10-CM

## 2022-08-08 DIAGNOSIS — Z12.4 SCREENING FOR MALIGNANT NEOPLASM OF CERVIX: ICD-10-CM

## 2022-08-08 PROCEDURE — 76830 TRANSVAGINAL US NON-OB: CPT | Performed by: OBSTETRICS & GYNECOLOGY

## 2022-08-08 PROCEDURE — 99214 OFFICE O/P EST MOD 30 MIN: CPT | Performed by: OBSTETRICS & GYNECOLOGY

## 2022-08-08 ASSESSMENT — PATIENT HEALTH QUESTIONNAIRE - PHQ9
SUM OF ALL RESPONSES TO PHQ QUESTIONS 1-9: 0
1. LITTLE INTEREST OR PLEASURE IN DOING THINGS: 0
SUM OF ALL RESPONSES TO PHQ QUESTIONS 1-9: 0

## 2022-08-08 NOTE — PROGRESS NOTES
HPI  Claudetta Dowdy is a 38 y.o. female seen for bleeding. She had an ablation a year ago and had no bleeding until  at which time she bled x 8 days. Past Medical History, Past Surgical History, Family history, Social History, and Medications were all reviewed with the patient today and updated as necessary. Current Outpatient Medications   Medication Sig    diphenhydrAMINE (SOMINEX) 25 MG tablet Take 25 mg by mouth     No current facility-administered medications for this visit. Allergies   Allergen Reactions    Hydrocodone-Acetaminophen Nausea And Vomiting     Past Medical History:   Diagnosis Date    Anxiety     Managed with meds     Chronic fatigue 3/17/2016    Chronic pain of left knee 2016    COVID-19 vaccine series completed 2021    Moderna vaccine     Headache in front of head 3/17/2016    3/15/16 States that her headache pain is always in the right side of her forehead over her right eye.     Insomnia, persistent 3/17/2016    Iron deficiency anemia 3/17/2016    Palpitations     Prediabetes 2016    Vitamin D deficiency 3/17/2016    3/15/16 Vit D Level 15.4      Past Surgical History:   Procedure Laterality Date    ENDOMETRIAL ABLATION      19    GYN      D&C 2009      Family History   Problem Relation Age of Onset    Hypertension Maternal Grandmother     Hypertension Mother     No Known Problems Sister     Breast Cancer Other         mat great grand mother    No Known Problems Sister     No Known Problems Brother     Breast Cancer Maternal Grandmother     No Known Problems Father     Cancer Other         Maternal Great GM-Breast and Cervical/Ovarian cancer      Social History     Tobacco Use    Smoking status: Never    Smokeless tobacco: Never   Substance Use Topics    Alcohol use: Yes     Comment: occ       Social History     Substance and Sexual Activity   Sexual Activity Yes    Partners: Female     OB History    Para Term  AB Living   2 2 0 0 0 0   SAB IAB Ectopic Molar Multiple Live Births   0 0 0 0 0 0      # Outcome Date GA Lbr Smith/2nd Weight Sex Delivery Anes PTL Lv   2 Para            1 Para               Obstetric Comments   vaginal       Health Maintenance  Mammogram:   Colonoscopy:   Bone Density:  Pap Smear-2010    ROS:    Review of Systems  General: Not Present- Chills, Fever, Fatigue, Insomnia, Hot flashes/Night sweats, Weight gain  Skin: Not Present- Bruising, Change in Wart/Mole, Excessive Sweating, Itching, Nail Changes, New Lesions, Rash, Skin Color Changes and Ulcer. HEENT: Not Present- Headache, Blurred Vision, Double Vision, Glaucoma, Visual Disturbances, Hearing Loss, Ringing in the Ears, Vertigo, Nose Bleed, Bleeding Gums, Hoarseness and Sore Throat. Neck: Not Present- Neck Pain and Neck Swelling. Respiratory: Not Present- Cough, Difficulty Breathing and Difficulty Breathing on Exertion. Breast: Not Present- Breast Mass, Breast Pain, Breast Swelling, Nipple Discharge, Nipple Pain, Recent Breast Size Changes and Skin Changes. Cardiovascular: Not Present- Abnormal Blood Pressure, Chest Pain, Edema, Fainting / Blacking Out, Palpitations, Shortness of Breath and Swelling of Extremities. Gastrointestinal: Not Present- Abdominal Pain, Abdominal Swelling, Bloating, Change in Bowel Habits, Constipation, Diarrhea, Difficulty Swallowing, Gets full quickly at meals, Nausea, Rectal Bleeding and Vomiting. Female Genitourinary: Not Present- Dysmenorrhea, Dyspareunia, Decreased libido, Excessive Menstrual Bleeding, Menstrual Irregularities, Pelvic Pain, Urinary Complaints, Vaginal Discharge, Vaginal itching/burning, Vaginal odor  Musculoskeletal: Not Present- Joint Pain and Muscle Pain. Neurological: Not Present- Dizziness, Fainting, Headaches and Seizures. Psychiatric: Not Present- Anxiety, Depression, Mood changes and Panic Attacks.   Endocrine: Not Present- Appetite Changes, Cold Intolerance, Excessive Thirst, Excessive Urination and Heat Intolerance. Hematology: Not Present- Abnormal Bleeding, Easy Bruising and Enlarged Lymph Nodes. PHYSICAL EXAM:    /86   Ht 5' 5\" (1.651 m)   Wt 274 lb (124.3 kg)   BMI 45.60 kg/m²     Physical Exam   General   Mental Status - Alert. General Appearance - Cooperative. Abdomen   Inspection: - Inspection Normal.   Palpation/Percussion: Palpation and Percussion of the abdomen reveal - Non Tender, No Rebound tenderness, No Rigidity (guarding), No hepatosplenomegaly, No Palpable abdominal masses and Soft. Auscultation: Auscultation of the abdomen reveals - Bowel sounds normal.     Female Genitourinary     External Genitalia   Vulva: - Normal. Perineum - Normal. Bartholin's Gland - Bilateral - Normal. Clitoris - Normal.   Introitus: Characteristics - Normal.   Urethra: Characteristics - Normal.     Speculum & Bimanual   Vagina: Vaginal Mucosa - Normal.   Vaginal Wall: - Normal.   Vaginal Lesions - None. Cervix: Characteristics - Normal.   Uterus: Characteristics - Normal.   Adnexa: - Normal.   Bladder - Normal.     Lymphatics  No cervical, axillary or groin adenopathy            Medical problems and test results were reviewed with the patient today. ASSESSMENT and PLAN    1. Irregular periods/menstrual cycles  -     US NON OB TRANSVAGINAL  2. Screening for malignant neoplasm of cervix  -     PAP LB, Reflex HPV ASCUS     Comment   [id number]----ndt   HISTORY: Had D&C and ablation 8/2021 for a polyp. No bleeding since ablation. Started bleeding like a period   for about 8 days. This was the first time she has done this since ablation. COMPARISON: Ultrasound 7/14/21----7wks with adeno, right simple cyst = 2.5/2.6/2.0cm and 13.7mm endo with   probable polyp   Enlarged uterus = 5wks   Endometrium = 3mm and appears normal   Rt ovary is normal.   Lt ovary is normal.   No free fluid noted in the pelvis. I reviewed the images of the US done in my office and discussed with patient. Advised since this is the first period she has had in over a year will observe for now. Time:  I spent  30 minutes in preparing to see patient (including chart review and preparation), obtaining and/or reviewing additional medical history, performing a physical exam and evaluation, documenting clinical information in the electronic health record, independently interpreting results, communicating results to patient, family or caregiver, and/or coordinating care. No follow-ups on file.        Lori Carr MD

## 2022-08-11 LAB
CYTOLOGIST CVX/VAG CYTO: NORMAL
CYTOLOGY CVX/VAG DOC THIN PREP: NORMAL
HPV REFLEX: NORMAL
Lab: NORMAL
PATH REPORT.FINAL DX SPEC: NORMAL
STAT OF ADQ CVX/VAG CYTO-IMP: NORMAL

## 2022-11-28 ENCOUNTER — TELEPHONE (OUTPATIENT)
Dept: FAMILY MEDICINE CLINIC | Facility: CLINIC | Age: 42
End: 2022-11-28

## 2022-11-28 RX ORDER — SCOLOPAMINE TRANSDERMAL SYSTEM 1 MG/1
1 PATCH, EXTENDED RELEASE TRANSDERMAL
Qty: 10 PATCH | Refills: 11 | Status: SHIPPED | OUTPATIENT
Start: 2022-11-28 | End: 2023-11-28

## 2022-11-28 NOTE — TELEPHONE ENCOUNTER
----- Message from Joe Toney sent at 11/28/2022  1:23 PM EST -----  Subject: Refill Request    QUESTIONS  Name of Medication? Other - Motion Sickness Patch  Patient-reported dosage and instructions? As Needed  How many days do you have left? 0  Preferred Pharmacy? [facility]  Pharmacy phone number (if available)? [phone]  Additional Information for Provider? Patient will be leaving on a cruise   and needs patch.   ---------------------------------------------------------------------------  --------------  CALL BACK INFO  What is the best way for the office to contact you? OK to leave message on   voicemail  Preferred Call Back Phone Number? [phone]  ---------------------------------------------------------------------------  --------------  SCRIPT ANSWERS  Relationship to Patient?  Self

## 2022-12-07 ENCOUNTER — TELEMEDICINE (OUTPATIENT)
Dept: FAMILY MEDICINE CLINIC | Facility: CLINIC | Age: 42
End: 2022-12-07
Payer: COMMERCIAL

## 2022-12-07 DIAGNOSIS — R05.9 COUGH IN ADULT PATIENT: Primary | ICD-10-CM

## 2022-12-07 LAB
EXP DATE SOLUTION: NORMAL
EXP DATE SWAB: NORMAL
EXPIRATION DATE: NORMAL
INFLUENZA A ANTIGEN, POC: NEGATIVE
INFLUENZA B ANTIGEN, POC: NEGATIVE
LOT NUMBER POC: NORMAL
LOT NUMBER SOLUTION: NORMAL
LOT NUMBER SWAB: NORMAL
SARS-COV-2 RNA, POC: NEGATIVE
VALID INTERNAL CONTROL, POC: YES

## 2022-12-07 PROCEDURE — 87635 SARS-COV-2 COVID-19 AMP PRB: CPT | Performed by: NURSE PRACTITIONER

## 2022-12-07 PROCEDURE — 99213 OFFICE O/P EST LOW 20 MIN: CPT | Performed by: NURSE PRACTITIONER

## 2022-12-07 PROCEDURE — 87804 INFLUENZA ASSAY W/OPTIC: CPT | Performed by: NURSE PRACTITIONER

## 2022-12-07 SDOH — ECONOMIC STABILITY: FOOD INSECURITY: WITHIN THE PAST 12 MONTHS, YOU WORRIED THAT YOUR FOOD WOULD RUN OUT BEFORE YOU GOT MONEY TO BUY MORE.: NEVER TRUE

## 2022-12-07 SDOH — ECONOMIC STABILITY: FOOD INSECURITY: WITHIN THE PAST 12 MONTHS, THE FOOD YOU BOUGHT JUST DIDN'T LAST AND YOU DIDN'T HAVE MONEY TO GET MORE.: NEVER TRUE

## 2022-12-07 ASSESSMENT — SOCIAL DETERMINANTS OF HEALTH (SDOH): HOW HARD IS IT FOR YOU TO PAY FOR THE VERY BASICS LIKE FOOD, HOUSING, MEDICAL CARE, AND HEATING?: NOT HARD AT ALL

## 2022-12-07 NOTE — PROGRESS NOTES
Payam Truong (:  1980) is a Established patient, here for evaluation of the following:  illness  Assessment & Plan   Below is the assessment and plan developed based on review of pertinent history, physical exam, labs, studies, and medications. 1. Cough in adult patient  -     AMB POC COVID-19 COV  -     AMB POC RAPID INFLUENZA TEST  No follow-ups on file. Work note as requested. Push fluids get inhaler rx filled and use for cough. Continue mucinex emergent statusFailure to improve, or worsening symptoms return. Subjective   HPI Illness started Thursday dec 1 and it started with an exposure to rsv and covid. She developed a cough was sent home She has had a bad headache . Eyes were sensitive to light has had some diarrhea some sweats and a low grade fever. Has congestion . Has been taking mucinex dm . She went to Dr care on Saturday  she was not tested for flu or covid. Has had a flu shot. Was told to take mucinex dm and had a virus just needed to run its course. Has a bad sore throat as well due to the bad cough. Review of Systems       Objective   Patient-Reported Vitals  No data recorded     Physical Exam       Congested voice no respiratory distress      Payam Truong, was evaluated through a synchronous (real-time) audio-video encounter. The patient (or guardian if applicable) is aware that this is a billable service, which includes applicable co-pays. This Virtual Visit was conducted with patient's (and/or legal guardian's) consent. The visit was conducted pursuant to the emergency declaration under the [address] [place] [address], [address] [place] [address] authority and the [employer] [employer] [place] Act. Patient identification was verified, and a caregiver was present when appropriate. The patient was located at [facility] ([address] [place] [address]): [address] [facility],  [address].    Provider was located at [facility] (Appt Dept): [address],  [place] [address].         --AMANDA Condon - NP

## 2022-12-07 NOTE — LETTER
[address] [facility] [address] [address]  Phone: [phone]  Fax: [phone]    AMANDA Velasquez NP        December 7, 2022     Patient: Jcarlos Glover   YOB: 1980   Date of Visit: 12/7/2022       To Whom It May Concern:    Please excuse work absence due to illness for Татьяна Kunz from 12/ 8-8/18 due to illness or allow to work from home. .    If you have any questions or concerns, please don't hesitate to call.     Sincerely,        AMANDA Velasquez NP

## 2022-12-13 ENCOUNTER — TELEPHONE (OUTPATIENT)
Dept: FAMILY MEDICINE CLINIC | Facility: CLINIC | Age: 42
End: 2022-12-13

## 2022-12-13 NOTE — TELEPHONE ENCOUNTER
Patient had a virtual visit on 12-7-22 with NELLY Pantoja and was told she had RSV. She has been taking Musinex DM for the past 2 weeks and not getting better. She States that she has rattling in her chest. She wants to see if she can get some medication to help her.

## 2022-12-13 NOTE — TELEPHONE ENCOUNTER
Since she has been seen almost a week ago, I'll need to examine her prior to sending anything in. Please help her schedule and if not available, will need to see UC.

## 2022-12-14 ENCOUNTER — OFFICE VISIT (OUTPATIENT)
Dept: FAMILY MEDICINE CLINIC | Facility: CLINIC | Age: 42
End: 2022-12-14
Payer: COMMERCIAL

## 2022-12-14 VITALS
TEMPERATURE: 96.6 F | HEIGHT: 65 IN | RESPIRATION RATE: 20 BRPM | WEIGHT: 271 LBS | BODY MASS INDEX: 45.15 KG/M2 | HEART RATE: 76 BPM | DIASTOLIC BLOOD PRESSURE: 96 MMHG | OXYGEN SATURATION: 100 % | SYSTOLIC BLOOD PRESSURE: 142 MMHG

## 2022-12-14 DIAGNOSIS — J18.9 COMMUNITY ACQUIRED PNEUMONIA OF RIGHT MIDDLE LOBE OF LUNG: Primary | ICD-10-CM

## 2022-12-14 PROCEDURE — 99214 OFFICE O/P EST MOD 30 MIN: CPT | Performed by: FAMILY MEDICINE

## 2022-12-14 RX ORDER — AZITHROMYCIN 250 MG/1
250 TABLET, FILM COATED ORAL SEE ADMIN INSTRUCTIONS
Qty: 6 TABLET | Refills: 0 | Status: SHIPPED | OUTPATIENT
Start: 2022-12-14 | End: 2022-12-19

## 2022-12-14 RX ORDER — ALBUTEROL SULFATE 90 UG/1
AEROSOL, METERED RESPIRATORY (INHALATION)
COMMUNITY
Start: 2022-12-03

## 2022-12-14 RX ORDER — AMOXICILLIN 500 MG/1
500 CAPSULE ORAL 2 TIMES DAILY
Qty: 20 CAPSULE | Refills: 0 | Status: SHIPPED | OUTPATIENT
Start: 2022-12-14 | End: 2022-12-24

## 2022-12-14 RX ORDER — FLUCONAZOLE 150 MG/1
150 TABLET ORAL DAILY
Qty: 3 TABLET | Refills: 0 | Status: SHIPPED | OUTPATIENT
Start: 2022-12-14 | End: 2022-12-17

## 2022-12-14 RX ORDER — GUAIFENESIN AND CODEINE PHOSPHATE 100; 10 MG/5ML; MG/5ML
5 SOLUTION ORAL EVERY 4 HOURS PRN
Qty: 118 ML | Refills: 0 | Status: SHIPPED | OUTPATIENT
Start: 2022-12-14 | End: 2022-12-19

## 2022-12-14 ASSESSMENT — ENCOUNTER SYMPTOMS
SINUS PRESSURE: 0
WHEEZING: 1
COUGH: 1
ABDOMINAL PAIN: 0
RHINORRHEA: 0
NAUSEA: 0
SORE THROAT: 0
VOMITING: 0
SHORTNESS OF BREATH: 1
DIARRHEA: 0

## 2022-12-14 ASSESSMENT — PATIENT HEALTH QUESTIONNAIRE - PHQ9
SUM OF ALL RESPONSES TO PHQ9 QUESTIONS 1 & 2: 0
SUM OF ALL RESPONSES TO PHQ QUESTIONS 1-9: 0
2. FEELING DOWN, DEPRESSED OR HOPELESS: 0
1. LITTLE INTEREST OR PLEASURE IN DOING THINGS: 0
SUM OF ALL RESPONSES TO PHQ QUESTIONS 1-9: 0

## 2022-12-14 NOTE — PROGRESS NOTES
Sydni Cervantes (:  1980) is a 38 y.o. female,Established patient, here for evaluation of the following chief complaint(s):  Cough (And bringing up deep yellow/brown phlegm. Does get a burning feeling her her chest sometime when she is coughing and get some shortness of breast.), Chest Congestion, and Other (Has been over 10 years since the last Tdap shot.)         ASSESSMENT/PLAN:  1. Community acquired pneumonia of right middle lobe of lung  -     amoxicillin (AMOXIL) 500 MG capsule; Take 1 capsule by mouth 2 times daily for 10 days, Disp-20 capsule, R-0Normal  -     azithromycin (ZITHROMAX) 250 MG tablet; Take 1 tablet by mouth See Admin Instructions for 5 days 500mg on day 1 followed by 250mg on days 2 - 5, Disp-6 tablet, R-0Normal  -     fluconazole (DIFLUCAN) 150 MG tablet; Take 1 tablet by mouth daily for 3 days, Disp-3 tablet, R-0Normal  -     guaiFENesin-codeine (CHERATUSSIN AC) 100-10 MG/5ML syrup; Take 5 mLs by mouth every 4 hours as needed for Cough for up to 5 days. , Disp-118 mL, R-0Normal  Return if not improving. C/w albuterol every 4 hours as needed. No follow-ups on file. Subjective   SUBJECTIVE/OBJECTIVE:  HPI  Chief Complaint   Patient presents with    Cough     And bringing up deep yellow/brown phlegm. Does get a burning feeling her her chest sometime when she is coughing and get some shortness of breast.    Chest Congestion    Other     Has been over 10 years since the last Tdap shot. Went to Dr's care  and given inhaler and mucinex. Went back to work but was told to come back and see the Dr. Ryan Brown 22 by NELLY Gonzalez for URI w/ covid and flu tests neg and presumed to have RSV. Told to use her inhaler. Cough is worse at night and sleeping is disrupted. Has rattling in her chest that is waking her up. Yellow brown phlegm for the last few days. Has some blood tinged mucous this morning. Cough started about . Chest is burning as well.  No h/o asthma. Denies fever, sweats, chills recently. Review of Systems   Constitutional:  Negative for chills, diaphoresis, fatigue and fever. HENT:  Negative for congestion, ear pain, postnasal drip, rhinorrhea, sinus pressure, sneezing and sore throat. Respiratory:  Positive for cough, shortness of breath and wheezing. Cardiovascular:  Negative for chest pain and palpitations. Gastrointestinal:  Negative for abdominal pain, diarrhea, nausea and vomiting. Musculoskeletal:  Negative for myalgias. Skin:  Negative for rash. Neurological:  Negative for headaches. Hematological:  Negative for adenopathy. Psychiatric/Behavioral:  Positive for sleep disturbance. Objective   Physical Exam  Vitals reviewed. Constitutional:       Appearance: Normal appearance. She is obese. HENT:      Head: Normocephalic. Eyes:      Extraocular Movements: Extraocular movements intact. Conjunctiva/sclera: Conjunctivae normal.      Pupils: Pupils are equal, round, and reactive to light. Cardiovascular:      Rate and Rhythm: Normal rate and regular rhythm. Heart sounds: Normal heart sounds. No murmur heard. Pulmonary:      Effort: Pulmonary effort is normal. No respiratory distress. Breath sounds: Wheezing and rhonchi (worse on right w/ decreased bs at the base) present. Abdominal:      General: Bowel sounds are normal.      Palpations: Abdomen is soft. Musculoskeletal:         General: Normal range of motion. Skin:     General: Skin is warm and dry. Coloration: Skin is not pale. Findings: No rash. Neurological:      General: No focal deficit present. Mental Status: She is alert.    Psychiatric:         Mood and Affect: Mood normal.         Behavior: Behavior normal.   BP (!) 142/96 (Site: Left Upper Arm, Position: Sitting, Cuff Size: Large Adult)   Pulse 76   Temp (!) 96.6 °F (35.9 °C)   Resp 20   Ht 5' 5\" (1.651 m)   Wt 271 lb (122.9 kg)   SpO2 100%   BMI 45.10 kg/m²        An electronic signature was used to authenticate this note.     --Shital Simms MD

## 2022-12-21 ENCOUNTER — OFFICE VISIT (OUTPATIENT)
Dept: FAMILY MEDICINE CLINIC | Facility: CLINIC | Age: 42
End: 2022-12-21
Payer: COMMERCIAL

## 2022-12-21 VITALS
TEMPERATURE: 96.8 F | OXYGEN SATURATION: 95 % | SYSTOLIC BLOOD PRESSURE: 134 MMHG | HEIGHT: 65 IN | RESPIRATION RATE: 20 BRPM | WEIGHT: 267 LBS | DIASTOLIC BLOOD PRESSURE: 80 MMHG | BODY MASS INDEX: 44.48 KG/M2 | HEART RATE: 90 BPM

## 2022-12-21 DIAGNOSIS — Z00.00 ENCOUNTER FOR WELL ADULT EXAM WITHOUT ABNORMAL FINDINGS: Primary | ICD-10-CM

## 2022-12-21 DIAGNOSIS — I26.99 PULMONARY EMBOLISM ON LONG-TERM ANTICOAGULATION THERAPY (HCC): ICD-10-CM

## 2022-12-21 DIAGNOSIS — Z79.01 PULMONARY EMBOLISM ON LONG-TERM ANTICOAGULATION THERAPY (HCC): ICD-10-CM

## 2022-12-21 DIAGNOSIS — I26.99 PE (PULMONARY THROMBOEMBOLISM) (HCC): ICD-10-CM

## 2022-12-21 DIAGNOSIS — Z00.00 ENCOUNTER FOR WELL ADULT EXAM WITHOUT ABNORMAL FINDINGS: ICD-10-CM

## 2022-12-21 LAB
ALBUMIN SERPL-MCNC: 3.4 G/DL (ref 3.5–5)
ALBUMIN/GLOB SERPL: 1 (ref 0.4–1.6)
ALP SERPL-CCNC: 50 U/L (ref 50–136)
ALT SERPL-CCNC: 32 U/L (ref 12–65)
ANION GAP SERPL CALC-SCNC: 4 MMOL/L (ref 2–11)
AST SERPL-CCNC: 20 U/L (ref 15–37)
BASOPHILS # BLD: 0 K/UL (ref 0–0.2)
BASOPHILS NFR BLD: 0 % (ref 0–2)
BILIRUB SERPL-MCNC: 0.3 MG/DL (ref 0.2–1.1)
BUN SERPL-MCNC: 14 MG/DL (ref 6–23)
CALCIUM SERPL-MCNC: 8.8 MG/DL (ref 8.3–10.4)
CHLORIDE SERPL-SCNC: 104 MMOL/L (ref 101–110)
CHOLEST SERPL-MCNC: 187 MG/DL
CO2 SERPL-SCNC: 29 MMOL/L (ref 21–32)
CREAT SERPL-MCNC: 1.1 MG/DL (ref 0.6–1)
DIFFERENTIAL METHOD BLD: ABNORMAL
EOSINOPHIL # BLD: 0 K/UL (ref 0–0.8)
EOSINOPHIL NFR BLD: 0 % (ref 0.5–7.8)
ERYTHROCYTE [DISTWIDTH] IN BLOOD BY AUTOMATED COUNT: 13.8 % (ref 11.9–14.6)
GLOBULIN SER CALC-MCNC: 3.5 G/DL (ref 2.8–4.5)
GLUCOSE SERPL-MCNC: 194 MG/DL (ref 65–100)
HCT VFR BLD AUTO: 41 % (ref 35.8–46.3)
HDLC SERPL-MCNC: 64 MG/DL (ref 40–60)
HDLC SERPL: 2.9
HGB BLD-MCNC: 12.5 G/DL (ref 11.7–15.4)
IMM GRANULOCYTES # BLD AUTO: 0.1 K/UL (ref 0–0.5)
IMM GRANULOCYTES NFR BLD AUTO: 1 % (ref 0–5)
LDLC SERPL CALC-MCNC: 107.6 MG/DL
LYMPHOCYTES # BLD: 1.2 K/UL (ref 0.5–4.6)
LYMPHOCYTES NFR BLD: 14 % (ref 13–44)
MCH RBC QN AUTO: 26.9 PG (ref 26.1–32.9)
MCHC RBC AUTO-ENTMCNC: 30.5 G/DL (ref 31.4–35)
MCV RBC AUTO: 88.2 FL (ref 82–102)
MONOCYTES # BLD: 0.2 K/UL (ref 0.1–1.3)
MONOCYTES NFR BLD: 2 % (ref 4–12)
NEUTS SEG # BLD: 7.2 K/UL (ref 1.7–8.2)
NEUTS SEG NFR BLD: 83 % (ref 43–78)
NRBC # BLD: 0 K/UL (ref 0–0.2)
PLATELET # BLD AUTO: 334 K/UL (ref 150–450)
PMV BLD AUTO: 10.6 FL (ref 9.4–12.3)
POTASSIUM SERPL-SCNC: 4.3 MMOL/L (ref 3.5–5.1)
PROT SERPL-MCNC: 6.9 G/DL (ref 6.3–8.2)
RBC # BLD AUTO: 4.65 M/UL (ref 4.05–5.2)
SODIUM SERPL-SCNC: 137 MMOL/L (ref 133–143)
TRIGL SERPL-MCNC: 77 MG/DL (ref 35–150)
VLDLC SERPL CALC-MCNC: 15.4 MG/DL (ref 6–23)
WBC # BLD AUTO: 8.7 K/UL (ref 4.3–11.1)

## 2022-12-21 PROCEDURE — 99396 PREV VISIT EST AGE 40-64: CPT | Performed by: FAMILY MEDICINE

## 2022-12-21 RX ORDER — PREDNISONE 20 MG/1
TABLET ORAL
COMMUNITY
Start: 2022-12-19

## 2022-12-21 RX ORDER — ONDANSETRON 4 MG/1
TABLET, ORALLY DISINTEGRATING ORAL
COMMUNITY
Start: 2022-12-17

## 2022-12-21 RX ORDER — APIXABAN 5 MG (74)
KIT ORAL
COMMUNITY
Start: 2022-12-19

## 2022-12-21 RX ORDER — BENZONATATE 100 MG/1
CAPSULE ORAL
COMMUNITY
Start: 2022-12-17

## 2022-12-21 NOTE — PROGRESS NOTES
Well Adult Note  Name: Baljinder Staff Date: 2022   MRN: [id number] Sex: Female   Age: 38 y.o. Ethnicity: Non- / Non    : 1980 Race: Alva Gastelum /       Arvin Cervantes is here for well adult exam.  History:  Chief Complaint   Patient presents with    Annual Exam     PAP done 22    Back Pain     In the middle of the back. She was seen for sob and cough 22 and dx w/ CAP. She continued to worsen and went to the ER on 22 and was dicharged saying she didn't have pneumonia. She went home, but then returned shortly thereafter for AMS 2/2 hypoxia. Found to have a PE. Treated for a few days and discharged on xarelto. She is feeling much better and significantly less sob and having no issues on xarelto. H/o uterine ablation last year, but only stopped her periods for 6 months. Has started having some intermittent vaginal bleeding and she is nervous on the blood thinners. Review of Systems   Constitutional:  Negative for diaphoresis and unexpected weight change. HENT:  Negative for congestion. Eyes:  Negative for visual disturbance. Respiratory:  Negative for cough and shortness of breath. Cardiovascular:  Negative for chest pain. Gastrointestinal:  Negative for blood in stool, constipation, diarrhea and vomiting. Genitourinary:  Negative for dysuria and hematuria. Neurological:  Negative for headaches. Hematological:  Does not bruise/bleed easily. Psychiatric/Behavioral:  Negative for dysphoric mood and sleep disturbance. The patient is not nervous/anxious. Allergies   Allergen Reactions    Hydrocodone-Acetaminophen Nausea And Vomiting         Prior to Visit Medications    Medication Sig Taking? Authorizing Provider   predniSONE (DELTASONE) 20 MG tablet TAKE 2 TABLETS BY MOUTH ONCE DAILY WITH BREAKFAST FOR 2 DAYS, THEN 1 TABLET DAILY WITH BREAKFAST FOR 2 DAYS, THEN ONE-HALF TAB DAILY FOR FOR 2 DAYS. DO NOT START BEFORE 22 Yes Historical Provider, MD Teresa Castellon DVT/PE STARTER PACK 5 MG TBPK tablet TAKE 2 (5MG) TABS TO EQUAL 10MG DOSE TWICE DAILY FOR 13 DOSES, THEN DECREASE TO 1 TABLET TWICE DAILY THEREAFTER Yes Historical Provider, MD   benzonatate (TESSALON) 100 MG capsule TAKE 1 CAPSULE THREE TIMES A DAY AS NEEDED FOR COUGH FOR UP TO 14 DAYS Yes Historical Provider, MD   ondansetron (ZOFRAN-ODT) 4 MG disintegrating tablet TAKE 1 TABLET (4MG) BY MOUTH THREE TIMES PER DAY AS NEEDED FOR NAUSEA OR VOMITING FOR UP TO 7 DAYS Yes Historical Provider, MD   albuterol sulfate HFA (PROVENTIL;VENTOLIN;PROAIR) 108 (90 Base) MCG/ACT inhaler INHALE 2 PUFFS BY MOUTH 4 TIMES DAILY. SEPARATE PUFFS BY 1-2 MINUTES. Yes Historical Provider, MD   scopolamine (TRANSDERM-SCOP, 1.5 MG,) transdermal patch Place 1 patch onto the skin every 72 hours Yes Magali Dutta MD   diphenhydrAMINE HCl (BENADRYL ALLERGY PO) Take by mouth as needed  Patient not taking: Reported on 2022  Historical Provider, MD         Past Medical History:   Diagnosis Date    Anxiety     Managed with meds     Chronic fatigue 3/17/2016    Chronic pain of left knee 2016    COVID-19 vaccine series completed 2021    Moderna vaccine     Headache in front of head 3/17/2016    3/15/16 States that her headache pain is always in the right side of her forehead over her right eye.     History of pulmonary embolus (PE)     Insomnia, persistent 3/17/2016    Iron deficiency anemia 3/17/2016    Palpitations     Prediabetes 2016    Vitamin D deficiency 3/17/2016    3/15/16 Vit D Level 15.4        Past Surgical History:   Procedure Laterality Date    ENDOMETRIAL ABLATION      19    GYN      D&C 2009          Family History   Problem Relation Age of Onset    Hypertension Maternal Grandmother     Hypertension Mother     No Known Problems Sister     Breast Cancer Other         mat great grand mother    No Known Problems Sister     No Known Problems Brother     Breast Cancer Maternal Grandmother     No Known Problems Father     Cancer Other         Maternal Great GM-Breast and Cervical/Ovarian cancer       Social History     Tobacco Use    Smoking status: Never    Smokeless tobacco: Never   Substance Use Topics    Alcohol use: Yes     Comment: occ    Drug use: No       Objective   /80 (Site: Right Upper Arm, Position: Sitting, Cuff Size: Thigh)   Pulse 90   Temp 96.8 °F (36 °C)   Resp 20   Ht 5' 5\" (1.651 m)   Wt 267 lb (121.1 kg)   SpO2 95%   BMI 44.43 kg/m²   Wt Readings from Last 3 Encounters:   12/21/22 267 lb (121.1 kg)   12/14/22 271 lb (122.9 kg)   08/08/22 274 lb (124.3 kg)     There were no vitals filed for this visit. Physical Exam  Vitals reviewed. Constitutional:       Appearance: Normal appearance. She is obese. HENT:      Head: Normocephalic. Eyes:      Extraocular Movements: Extraocular movements intact. Conjunctiva/sclera: Conjunctivae normal.      Pupils: Pupils are equal, round, and reactive to light. Cardiovascular:      Rate and Rhythm: Normal rate and regular rhythm. Heart sounds: Normal heart sounds. No murmur heard. Pulmonary:      Effort: Pulmonary effort is normal. No respiratory distress. Breath sounds: Normal breath sounds. Abdominal:      General: Bowel sounds are normal.      Palpations: Abdomen is soft. Musculoskeletal:         General: Normal range of motion. Skin:     General: Skin is warm and dry. Neurological:      General: No focal deficit present. Mental Status: She is alert. Psychiatric:         Mood and Affect: Mood normal.         Behavior: Behavior normal.   /80 (Site: Right Upper Arm, Position: Sitting, Cuff Size: Thigh)   Pulse 90   Temp 96.8 °F (36 °C)   Resp 20   Ht 5' 5\" (1.651 m)   Wt 267 lb (121.1 kg)   SpO2 95%   BMI 44.43 kg/m²         Assessment   Plan   1.  Encounter for well adult exam without abnormal findings  -     CBC with Auto Differential; Future  -     Comprehensive Metabolic Panel; Future  -     Lipid Panel; Future  2. PE (pulmonary thromboembolism) ([facility] [address]  3. Pulmonary embolism on long-term anticoagulation therapy [facility])       Personalized Preventive Plan   Current Health Maintenance Status  Immunization History   Administered Date(s) Administered    COVID-19, MODERNA BLUE border, Primary or Immunocompromised, (age 12y+), IM, 100 mcg/0.5mL 04/24/2021, 05/22/2021    Influenza, FLUCELVAX, (age 10 mo+), MDCK, MDV, 0.5mL 11/07/2022    Influenza, FLUCELVAX, (age 10 mo+), MDCK, PF, 0.5mL 09/24/2019    Tdap (Boostrix, Adacel) 05/13/2012        Health Maintenance   Topic Date Due    COVID-19 Vaccine (3 - Booster for Moderna series) 07/17/2021    DTaP/Tdap/Td vaccine (2 - Td or Tdap) 05/13/2022    A1C test (Diabetic or Prediabetic)  12/30/2022    Depression Monitoring  12/14/2023    Cervical cancer screen  08/08/2027    Lipids  12/21/2027    Flu vaccine  Completed    Hepatitis C screen  Completed    HIV screen  Completed    Hepatitis A vaccine  Aged Out    Hib vaccine  Aged Out    Meningococcal (ACWY) vaccine  Aged Out    Pneumococcal 0-64 years Vaccine  Aged Out     Recommendations for [employer] Due: see orders and patient instructions/AVS.    No follow-ups on file. Statement Selected

## 2022-12-21 NOTE — PATIENT INSTRUCTIONS
Well Visit, Ages 25 to 72: Care Instructions  Well visits can help you stay healthy. Your doctor has checked your overall health and may have suggested ways to take good care of yourself. Your doctor also may have recommended tests. You can help prevent illness with healthy eating, good sleep, vaccinations, regular exercise, and other steps. Get the tests that you and your doctor decide on. Depending on your age and risks, examples might include screening for diabetes; hepatitis C; HIV; and cervical, breast, lung, and colon cancer. Screening helps find diseases before any symptoms appear. Eat healthy foods. Choose fruits, vegetables, whole grains, lean protein, and low-fat dairy foods. Limit saturated fat and reduce salt. Limit alcohol. Men should have no more than 2 drinks a day. Women should have no more than 1. For some people, no alcohol is the best choice. Exercise. Get at least 30 minutes of exercise on most days of the week. Walking can be a good choice. Reach and stay at your healthy weight. This will lower your risk for many health problems. Take care of your mental health. Try to stay connected with friends, family, and community, and find ways to manage stress. If you're feeling depressed or hopeless, talk to someone. A counselor can help. If you don't have a counselor, talk to your doctor. Talk to your doctor if you think you may have a problem with alcohol or drug use. This includes prescription medicines and illegal drugs. Avoid tobacco and nicotine: Don't smoke, vape, or chew. If you need help quitting, talk to your doctor. Practice safer sex. Getting tested, using condoms or dental dams, and limiting sex partners can help prevent STIs. Use birth control if it's important to you to prevent pregnancy. Talk with your doctor about your choices and what might be best for you. Prevent problems where you can.  Protect your skin from too much sun, wash your hands, brush your teeth twice a day, and wear a seat belt in the car. Where can you learn more? Go to http://www.brown.com/ and enter P072 to learn more about \"Well Visit, Ages 25 to 72: Care Instructions. \"  Current as of: March 9, 2022               Content Version: 13.5  © 1321-8156 [employer]. Care instructions adapted under license by [facility] [place] ([facility]). If you have questions about a medical condition or this instruction, always ask your healthcare professional. [address] any warranty or liability for your use of this information. A Healthy Lifestyle: Care Instructions  A healthy lifestyle can help you feel good, have more energy, and stay at a weight that's healthy for you. You can share a healthy lifestyle with your friends and family. And you can do it on your own. Eat meals with your friends or family. You could try cooking together. Plan activities with other people. Go for a walk with a friend, try a free online fitness class, or join a sports league. Eat a variety of healthy foods. These include fruits, vegetables, whole grains, low-fat dairy, and lean protein. Choose healthy portions of food. You can use the Nutrition Facts label on food packages as a guide. Eat more fruits and vegetables. You could add vegetables to sandwiches or add fruit to cereal.   Drink water when you are thirsty. Limit soda, juice, and sports drinks. Try to exercise most days. Aim for at least 2½ hours of exercise each week. Keep moving. Work in the garden or take your dog on a walk. Use the stairs instead of the elevator. If you use tobacco or nicotine, try to quit. Ask your doctor about programs and medicines to help you quit. Limit alcohol. Men should have no more than 2 drinks a day. Women should have no more than 1. For some people, no alcohol is the best choice. Follow-up care is a key part of your treatment and safety.  Be sure to make and go to all appointments, and call your doctor if you are having problems. It's also a good idea to know your test results and keep a list of the medicines you take. Where can you learn more? Go to http://www.brown.com/ and enter U807 to learn more about \"A Healthy Lifestyle: Care Instructions. \"  Current as of: March 9, 2022               Content Version: 13.5  © 2006-2022 [employer] (formerly [employer]). Care instructions adapted under license by [facility] ([facility]). If you have questions about a medical condition or this instruction, always ask your healthcare professional. [address] any warranty or liability for your use of this information.

## 2022-12-23 ENCOUNTER — TELEPHONE (OUTPATIENT)
Dept: FAMILY MEDICINE CLINIC | Facility: CLINIC | Age: 42
End: 2022-12-23

## 2022-12-23 NOTE — TELEPHONE ENCOUNTER
I called the patient, but got her voice mail box. I left a message letting her know about the results. I asked her to please call back if there are any questions.

## 2022-12-30 ASSESSMENT — ENCOUNTER SYMPTOMS
VOMITING: 0
SHORTNESS OF BREATH: 0
CONSTIPATION: 0
BLOOD IN STOOL: 0
DIARRHEA: 0
COUGH: 0

## 2023-01-04 DIAGNOSIS — D72.9 ABNORMAL NEUTROPHIL COUNT: ICD-10-CM

## 2023-01-04 DIAGNOSIS — D72.9 ABNORMAL NEUTROPHIL COUNT: Primary | ICD-10-CM

## 2023-01-04 DIAGNOSIS — D50.0 IRON DEFICIENCY ANEMIA SECONDARY TO BLOOD LOSS (CHRONIC): Primary | ICD-10-CM

## 2023-01-05 LAB
BASOPHILS # BLD: 0.1 K/UL (ref 0–0.2)
BASOPHILS NFR BLD: 1 % (ref 0–2)
DIFFERENTIAL METHOD BLD: ABNORMAL
EOSINOPHIL # BLD: 0.5 K/UL (ref 0–0.8)
EOSINOPHIL NFR BLD: 7 % (ref 0.5–7.8)
ERYTHROCYTE [DISTWIDTH] IN BLOOD BY AUTOMATED COUNT: 14.6 % (ref 11.9–14.6)
HCT VFR BLD AUTO: 41 % (ref 35.8–46.3)
HGB BLD-MCNC: 12.1 G/DL (ref 11.7–15.4)
IMM GRANULOCYTES # BLD AUTO: 0 K/UL (ref 0–0.5)
IMM GRANULOCYTES NFR BLD AUTO: 0 % (ref 0–5)
LYMPHOCYTES # BLD: 2.4 K/UL (ref 0.5–4.6)
LYMPHOCYTES NFR BLD: 32 % (ref 13–44)
MCH RBC QN AUTO: 26.9 PG (ref 26.1–32.9)
MCHC RBC AUTO-ENTMCNC: 29.5 G/DL (ref 31.4–35)
MCV RBC AUTO: 91.1 FL (ref 82–102)
MONOCYTES # BLD: 0.6 K/UL (ref 0.1–1.3)
MONOCYTES NFR BLD: 8 % (ref 4–12)
NEUTS SEG # BLD: 3.9 K/UL (ref 1.7–8.2)
NEUTS SEG NFR BLD: 52 % (ref 43–78)
NRBC # BLD: 0 K/UL (ref 0–0.2)
PLATELET # BLD AUTO: 301 K/UL (ref 150–450)
PMV BLD AUTO: 10.7 FL (ref 9.4–12.3)
RBC # BLD AUTO: 4.5 M/UL (ref 4.05–5.2)
WBC # BLD AUTO: 7.5 K/UL (ref 4.3–11.1)

## 2023-01-09 ENCOUNTER — PATIENT MESSAGE (OUTPATIENT)
Dept: FAMILY MEDICINE CLINIC | Facility: CLINIC | Age: 43
End: 2023-01-09

## 2023-01-09 NOTE — TELEPHONE ENCOUNTER
From: Jodie Argueta  Sent: 1/9/2023 2:19 PM EST  To: Marc Fong [facility] Clinical Staff  Subject: results of labs done 5 days ago. Great news. Will she be sending a refill for my Eliquis ? I only have a week left.

## 2023-01-10 ENCOUNTER — OFFICE VISIT (OUTPATIENT)
Dept: ONCOLOGY | Age: 43
End: 2023-01-10
Payer: COMMERCIAL

## 2023-01-10 ENCOUNTER — PATIENT MESSAGE (OUTPATIENT)
Dept: FAMILY MEDICINE CLINIC | Facility: CLINIC | Age: 43
End: 2023-01-10

## 2023-01-10 ENCOUNTER — HOSPITAL ENCOUNTER (OUTPATIENT)
Dept: LAB | Age: 43
Discharge: HOME | End: 2023-01-13
Payer: COMMERCIAL

## 2023-01-10 VITALS
SYSTOLIC BLOOD PRESSURE: 140 MMHG | DIASTOLIC BLOOD PRESSURE: 85 MMHG | HEART RATE: 65 BPM | TEMPERATURE: 98.6 F | BODY MASS INDEX: 45.22 KG/M2 | HEIGHT: 65 IN | OXYGEN SATURATION: 95 % | WEIGHT: 271.4 LBS | RESPIRATION RATE: 20 BRPM

## 2023-01-10 DIAGNOSIS — D50.0 IRON DEFICIENCY ANEMIA SECONDARY TO BLOOD LOSS (CHRONIC): Primary | ICD-10-CM

## 2023-01-10 DIAGNOSIS — D50.0 IRON DEFICIENCY ANEMIA SECONDARY TO BLOOD LOSS (CHRONIC): ICD-10-CM

## 2023-01-10 DIAGNOSIS — I26.99 ACUTE PULMONARY EMBOLISM, UNSPECIFIED PULMONARY EMBOLISM TYPE, UNSPECIFIED WHETHER ACUTE COR PULMONALE PRESENT (HCC): ICD-10-CM

## 2023-01-10 DIAGNOSIS — N92.1 EXCESSIVE AND FREQUENT MENSTRUATION WITH IRREGULAR CYCLE: ICD-10-CM

## 2023-01-10 LAB
ALBUMIN SERPL-MCNC: 3.5 G/DL (ref 3.5–5)
ALBUMIN/GLOB SERPL: 0.8 (ref 0.4–1.6)
ALP SERPL-CCNC: 59 U/L (ref 50–136)
ALT SERPL-CCNC: 20 U/L (ref 12–65)
ANION GAP SERPL CALC-SCNC: 7 MMOL/L (ref 2–11)
AST SERPL-CCNC: 8 U/L (ref 15–37)
BASOPHILS # BLD: 0 K/UL (ref 0–0.2)
BASOPHILS NFR BLD: 0 % (ref 0–2)
BILIRUB SERPL-MCNC: 0.2 MG/DL (ref 0.2–1.1)
BUN SERPL-MCNC: 12 MG/DL (ref 6–23)
CALCIUM SERPL-MCNC: 9.1 MG/DL (ref 8.3–10.4)
CHLORIDE SERPL-SCNC: 102 MMOL/L (ref 101–110)
CO2 SERPL-SCNC: 28 MMOL/L (ref 21–32)
CREAT SERPL-MCNC: 1 MG/DL (ref 0.6–1)
DIFFERENTIAL METHOD BLD: ABNORMAL
EOSINOPHIL # BLD: 0 K/UL (ref 0–0.8)
EOSINOPHIL NFR BLD: 0 % (ref 0.5–7.8)
ERYTHROCYTE [DISTWIDTH] IN BLOOD BY AUTOMATED COUNT: 13.9 % (ref 11.9–14.6)
FERRITIN SERPL-MCNC: 245 NG/ML (ref 8–388)
GLOBULIN SER CALC-MCNC: 4.3 G/DL (ref 2.8–4.5)
GLUCOSE SERPL-MCNC: 159 MG/DL (ref 65–100)
HCT VFR BLD AUTO: 40.5 % (ref 35.8–46.3)
HGB BLD-MCNC: 12.5 G/DL (ref 11.7–15.4)
IMM GRANULOCYTES # BLD AUTO: 0.1 K/UL (ref 0–0.5)
IMM GRANULOCYTES NFR BLD AUTO: 1 % (ref 0–5)
IRON SATN MFR SERPL: 15 %
IRON SERPL-MCNC: 44 UG/DL (ref 35–150)
LYMPHOCYTES # BLD: 1.6 K/UL (ref 0.5–4.6)
LYMPHOCYTES NFR BLD: 14 % (ref 13–44)
MCH RBC QN AUTO: 26.6 PG (ref 26.1–32.9)
MCHC RBC AUTO-ENTMCNC: 30.9 G/DL (ref 31.4–35)
MCV RBC AUTO: 86.2 FL (ref 82–102)
MONOCYTES # BLD: 0.3 K/UL (ref 0.1–1.3)
MONOCYTES NFR BLD: 2 % (ref 4–12)
NEUTS SEG # BLD: 9.4 K/UL (ref 1.7–8.2)
NEUTS SEG NFR BLD: 83 % (ref 43–78)
NRBC # BLD: 0 K/UL (ref 0–0.2)
PLATELET # BLD AUTO: 355 K/UL (ref 150–450)
PMV BLD AUTO: 9.9 FL (ref 9.4–12.3)
POTASSIUM SERPL-SCNC: 3.9 MMOL/L (ref 3.5–5.1)
PROT SERPL-MCNC: 7.8 G/DL (ref 6.3–8.2)
RBC # BLD AUTO: 4.7 M/UL (ref 4.05–5.2)
SODIUM SERPL-SCNC: 137 MMOL/L (ref 133–143)
TIBC SERPL-MCNC: 296 UG/DL (ref 250–450)
WBC # BLD AUTO: 11.4 K/UL (ref 4.3–11.1)

## 2023-01-10 PROCEDURE — 36415 COLL VENOUS BLD VENIPUNCTURE: CPT

## 2023-01-10 PROCEDURE — 99214 OFFICE O/P EST MOD 30 MIN: CPT | Performed by: INTERNAL MEDICINE

## 2023-01-10 PROCEDURE — 82728 ASSAY OF FERRITIN: CPT

## 2023-01-10 PROCEDURE — 83550 IRON BINDING TEST: CPT

## 2023-01-10 PROCEDURE — 85025 COMPLETE CBC W/AUTO DIFF WBC: CPT

## 2023-01-10 PROCEDURE — 80053 COMPREHEN METABOLIC PANEL: CPT

## 2023-01-10 RX ORDER — CETIRIZINE HYDROCHLORIDE 10 MG/1
TABLET ORAL
COMMUNITY
Start: 2023-01-06

## 2023-01-10 RX ORDER — ACETAMINOPHEN 325 MG/1
650 TABLET ORAL
OUTPATIENT
Start: 2023-01-17

## 2023-01-10 RX ORDER — EPINEPHRINE 1 MG/ML
0.3 INJECTION, SOLUTION, CONCENTRATE INTRAVENOUS PRN
OUTPATIENT
Start: 2023-01-17

## 2023-01-10 RX ORDER — PREDNISONE 20 MG/1
TABLET ORAL
COMMUNITY
Start: 2023-01-06

## 2023-01-10 RX ORDER — SODIUM CHLORIDE 9 MG/ML
INJECTION, SOLUTION INTRAVENOUS CONTINUOUS
OUTPATIENT
Start: 2023-01-17

## 2023-01-10 RX ORDER — IPRATROPIUM BROMIDE 42 UG/1
SPRAY, METERED NASAL
COMMUNITY
Start: 2023-01-06

## 2023-01-10 RX ORDER — SODIUM CHLORIDE 0.9 % (FLUSH) 0.9 %
5-40 SYRINGE (ML) INJECTION PRN
OUTPATIENT
Start: 2023-01-17

## 2023-01-10 RX ORDER — ALBUTEROL SULFATE 90 UG/1
4 AEROSOL, METERED RESPIRATORY (INHALATION) PRN
OUTPATIENT
Start: 2023-01-17

## 2023-01-10 RX ORDER — DIPHENHYDRAMINE HYDROCHLORIDE 50 MG/ML
50 INJECTION INTRAMUSCULAR; INTRAVENOUS
OUTPATIENT
Start: 2023-01-17

## 2023-01-10 RX ORDER — FAMOTIDINE 10 MG/ML
20 INJECTION, SOLUTION INTRAVENOUS
OUTPATIENT
Start: 2023-01-17

## 2023-01-10 RX ORDER — SODIUM CHLORIDE 9 MG/ML
5-250 INJECTION, SOLUTION INTRAVENOUS PRN
OUTPATIENT
Start: 2023-01-17

## 2023-01-10 RX ORDER — HEPARIN SODIUM (PORCINE) LOCK FLUSH IV SOLN 100 UNIT/ML 100 UNIT/ML
500 SOLUTION INTRAVENOUS PRN
OUTPATIENT
Start: 2023-01-17

## 2023-01-10 RX ORDER — ONDANSETRON 2 MG/ML
8 INJECTION INTRAMUSCULAR; INTRAVENOUS
OUTPATIENT
Start: 2023-01-17

## 2023-01-10 ASSESSMENT — PATIENT HEALTH QUESTIONNAIRE - PHQ9
SUM OF ALL RESPONSES TO PHQ9 QUESTIONS 1 & 2: 0
2. FEELING DOWN, DEPRESSED OR HOPELESS: 0
SUM OF ALL RESPONSES TO PHQ QUESTIONS 1-9: 0
1. LITTLE INTEREST OR PLEASURE IN DOING THINGS: 0
SUM OF ALL RESPONSES TO PHQ QUESTIONS 1-9: 0

## 2023-01-10 NOTE — TELEPHONE ENCOUNTER
From: Javan Walter  To: Dr. Calli Gerber: 1/10/2023 3:15 PM EST  Subject: Follow up. Over the weekend, I was back at [facility]. The doctor said I have a virus / upper respiratory infection. He gave me steriods, nasal spray, and Ceitrizine. I am concerned because I have had these issues since November. It always appears to be clearing up and then everything starts back over. What can we do ?

## 2023-01-10 NOTE — PATIENT INSTRUCTIONS
Patient Instructions from Today's Visit    Reason for Visit:  Follow up. Plan: Will do one IV iron infusion. You may need another ablation or hysterectomy for uterine bleeding. Taking eliquis for recent PE. Recommend you take this for total of 6 months      Follow Up:  3 months with labs.       Recent Lab Results:  Hospital Outpatient Visit on 01/10/2023   Component Date Value Ref Range Status    WBC 01/10/2023 11.4 (A)  4.3 - 11.1 K/uL Final    RBC 01/10/2023 4.70  4.05 - 5.2 M/uL Final    Hemoglobin 01/10/2023 12.5  11.7 - 15.4 g/dL Final    Hematocrit 01/10/2023 40.5  35.8 - 46.3 % Final    MCV 01/10/2023 86.2  82.0 - 102.0 FL Final    MCH 01/10/2023 26.6  26.1 - 32.9 PG Final    MCHC 01/10/2023 30.9 (A)  31.4 - 35.0 g/dL Final    RDW 01/10/2023 13.9  11.9 - 14.6 % Final    Platelets 98/61/1385 355  150 - 450 K/uL Final    MPV 01/10/2023 9.9  9.4 - 12.3 FL Final    nRBC 01/10/2023 0.00  0.0 - 0.2 K/uL Final    **Note: Absolute NRBC parameter is now reported with Hemogram**    Seg Neutrophils 01/10/2023 83 (A)  43 - 78 % Final    Lymphocytes 01/10/2023 14  13 - 44 % Final    Monocytes 01/10/2023 2 (A)  4.0 - 12.0 % Final    Eosinophils % 01/10/2023 0 (A)  0.5 - 7.8 % Final    Basophils 01/10/2023 0  0.0 - 2.0 % Final    Immature Granulocytes 01/10/2023 1  0.0 - 5.0 % Final    Segs Absolute 01/10/2023 9.4 (A)  1.7 - 8.2 K/UL Final    Absolute Lymph # 01/10/2023 1.6  0.5 - 4.6 K/UL Final    Absolute Mono # 01/10/2023 0.3  0.1 - 1.3 K/UL Final    Absolute Eos # 01/10/2023 0.0  0.0 - 0.8 K/UL Final    Basophils Absolute 01/10/2023 0.0  0.0 - 0.2 K/UL Final    Absolute Immature Granulocyte 01/10/2023 0.1  0.0 - 0.5 K/UL Final    Differential Type 01/10/2023 AUTOMATED    Final    Sodium 01/10/2023 137  133 - 143 mmol/L Final    Potassium 01/10/2023 3.9  3.5 - 5.1 mmol/L Final    Chloride 01/10/2023 102  101 - 110 mmol/L Final    CO2 01/10/2023 28  21 - 32 mmol/L Final    Anion Gap 01/10/2023 7  2 - 11 mmol/L Final    Glucose 01/10/2023 159 (A)  65 - 100 mg/dL Final    BUN 01/10/2023 12  6 - 23 MG/DL Final    Creatinine 01/10/2023 1.00  0.6 - 1.0 MG/DL Final    Est, Glom Filt Rate 01/10/2023 >60  >60 ml/min/1.73m2 Final    Comment:      Pediatric calculator link: Gregorio.at. org/professionals/kdoqi/gfr_calculatorped       These results are not intended for use in patients <25years of age. eGFR results are calculated without a race factor using  the 2021 CKD-EPI equation. Careful clinical correlation is recommended, particularly when comparing to results calculated using previous equations. The CKD-EPI equation is less accurate in patients with extremes of muscle mass, extra-renal metabolism of creatinine, excessive creatine ingestion, or following therapy that affects renal tubular secretion. Calcium 01/10/2023 9.1  8.3 - 10.4 MG/DL Final    Total Bilirubin 01/10/2023 0.2  0.2 - 1.1 MG/DL Final    ALT 01/10/2023 20  12 - 65 U/L Final    AST 01/10/2023 8 (A)  15 - 37 U/L Final    Alk Phosphatase 01/10/2023 59  50 - 136 U/L Final    Total Protein 01/10/2023 7.8  6.3 - 8.2 g/dL Final    Albumin 01/10/2023 3.5  3.5 - 5.0 g/dL Final    Globulin 01/10/2023 4.3  2.8 - 4.5 g/dL Final    Albumin/Globulin Ratio 01/10/2023 0.8  0.4 - 1.6   Final    Ferritin 01/10/2023 245  8 - 388 NG/ML Final    Iron 01/10/2023 44  35 - 150 ug/dL Final    Comment: Known Interfering Substances section:  \"Iron values may be falsely elevated in  serum samples from patients with  anticoagulants (e.g., hemodialysis patients). \"  Limitations of Procedure section:  \"Turbidity resulting from precipitation of  fibrinogen in the serum of patients treated  with anticoagulants (e.g. hemodialysis  patients) may cause spuriously elevated  iron results. \"      TIBC 01/10/2023 296  250 - 450 ug/dL Final    TRANSFERRIN SATURATION 01/10/2023 15 (A)  >20 % Final         Treatment Summary has been discussed and given to patient: n/a        -------------------------------------------------------------------------------------------------------------------  Please call our office at [phone] if you have any  of the following symptoms:   Fever of 100.5 or greater  Chills  Shortness of breath  Swelling or pain in one leg    After office hours an answering service is available and will contact a provider for emergencies or if you are experiencing any of the above symptoms. Patient did express an interest in My Chart. My Chart log in information explained on the after visit summary printout at the [facility] [address] desk.     Ever Acosta RN

## 2023-01-10 NOTE — PROGRESS NOTES
[facility] Hematology & Oncology: Office Visit Progress Note      Chief Complaint:     SCOTT      History of Present Illness:   Reason for Referral:  Iron deficiency anemia       Referring Provider:  Dr. Loyd Davila       Primary Care Provider:  Dr. Loyd Davila       Family History of Cancer/Hematologic disorders:  MGM, Breast Ca; Great MGM, Breast, Cervical, Ovarian Ca       Presenting Symptoms:  Syncope       Ms. Evelyn Solis is a 38 y.o. female whose medical history includes SCOTT, menometrorrhagia, obesity, persistent insomnia, headache, Vit  D deficiency, chronic fatigue, L knee chronic pain, palpitations, anxiety, pre-diabetes, , hysteroscopy w/endometrial ablation. Ms. Sonya Solis presented to [place] [address] ER via EMS on  for syncope. She reported having a D&C w/ablation (to control bleeding) the week prior to ER visit and had fatigue, SOB, and weakness since the procedure. She received 1 unit PRBC in the ER. Ms. Sonya Solis presented to Dr. Jackelyn Brar on  for follow ER up. She was prescribed hemoplex supplement and additional labs were drawn. She denied any overt bleeding, but did report heart racing and feeling SOB w/exertion, intermittent chest pain  and heart palpitations. Her labs on  showed H/H 8.5/31.1, MCV 78, MCH 21.4, MCHC 27.3, , , ABS LYMPH 3.8. She saw Dr. Karla Esqueda, cardiology on . Iron levels were drawn and EKG performed with changes consistent with atrial enlargement. Labs showed IRON 13, TIBC 388, TRANS SAT 3, FERRITIN 21. Ms. Sonya Solis is referred to [facility] for further evaluation and consideration of iron infusion for Iron deficiency anemia.                 8/10/2021 09:25  2021 06:56  2021 21:55  2021 16:49           WBC  6.1     9.7  10.4     RBC  3.09 (L)     3.47 (L)  3.98     HGB  6.7 (LL)  7.0 (L)  7.3 (L)  8.5 (L)     HCT  23.2 (L)     25.5 (L)  31.1 (L)     MCV  75.1 (L)     73.5 (L)  78 (L)     MCH  21.7 (L)     21.0 (L)  21.4 (L)     MCHC  28.9 (L)     28.6 (L)  27.3 (L)     RDW  17.1 (H)     17.9 (H)  17.2 (H)     PLATELET  129 (H)     496 (H)  498 (H)     MPV  10.1     10.3        NEUTROPHILS        70  52     LYMPHOCYTES        20        Lymphocytes           36     MONOCYTES        8  9     EOSINOPHILS        2  2     BASOPHILS        1  1     IMMATURE GRANULOCYTES        0  0     DF        AUTOMATED        ABSOLUTE NRBC  0.00     0.00              ABS. NEUTROPHILS        6.7  5.4           ABS. IMM. GRANS.        0.0  0.0     ABS. LYMPHOCYTES        1.9        Abs Lymphocytes           3.8 (H)     ABS. MONOCYTES        0.7  0.9     ABS. EOSINOPHILS        0.2  0.2     ABS. BASOPHILS        0.1  0.1     Sodium        139        Potassium        3.9        Chloride        107        CO2        27        Anion gap        5 (L)        Glucose        112 (H)        BUN        9        Creatinine        0.89        Calcium        8.4        GFR est non-AA        >60        GFR est AA        >60        Bilirubin, total        0.2        Protein, total        7.4        Albumin        3.2 (L)        Globulin        4.2 (H)        A-G Ratio        0.8 (L)        ALT        18        AST        18        Alk. phosphatase        55                  8/31/2021 16:42     Iron  13 (L)     TIBC  388     Transferrin Saturation  3 (L)     Ferritin  21         Notes from referring Provider:  Per Dr. Payam Lee, patient needs iron infusions set up at the infusion center in the oncology building. If you do not admit just text needed she does it all the time             Review of Systems:      Constitutional  Denies fever or chills. Denies weight loss or appetite changes. Denies fatigue. Denies anorexia. HEENT  Denies trauma, bluring vision, hearing loss, ear pain, nosebleeds, sore throat, neck pain and ear discharge. Skin  Denies lesions or rashes.         Lungs  Denies shortness of breath, cough, sputum production or hemoptysis. Cardiovascular  Denies chest pain, palpitations, orthopnea, claudication and leg swelling. Gastrointestinal  Denies nausea, vomiting, bowel changes. Denies bloody or black stools. Denies abdominal pain.    menorrhagia. Denies dysuria, frequency or hesitancy of urination     Neuro  Denies headaches, visual changes or ataxia. Denies dizziness, tingling, tremors, sensory change, speech change, focal weakness and headaches. Hematology  Denies nasal/gum bleeding, denies easy bruise     Endo  Denies heat/cold intolerance, denies diabetes. MSK  Denies back pain, swollen legs, myalgias and falls. Psychiatric/Behavioral  Denies depression and substance abuse. The patient is not nervous/anxious. Allergies   Allergen Reactions    Hydrocodone-Acetaminophen Nausea And Vomiting     Past Medical History:   Diagnosis Date    Anxiety     Managed with meds     Chronic fatigue 3/17/2016    Chronic pain of left knee 2016    COVID-19 vaccine series completed 2021    Moderna vaccine     Headache in front of head 3/17/2016    3/15/16 States that her headache pain is always in the right side of her forehead over her right eye.     History of pulmonary embolus (PE)     Insomnia, persistent 3/17/2016    Iron deficiency anemia 3/17/2016    Palpitations     Prediabetes 2016    Vitamin D deficiency 3/17/2016    3/15/16 Vit D Level 15.4      Past Surgical History:   Procedure Laterality Date    ENDOMETRIAL ABLATION      19    GYN      D&C 2009      Family History   Problem Relation Age of Onset    Hypertension Maternal Grandmother     Hypertension Mother     No Known Problems Sister     Breast Cancer Other         mat great grand mother    No Known Problems Sister     No Known Problems Brother     Breast Cancer Maternal Grandmother     No Known Problems Father     Cancer Other         Maternal Great GM-Breast and Cervical/Ovarian cancer     Social History Socioeconomic History    Marital status:      Spouse name: Not on file    Number of children: Not on file    Years of education: Not on file    Highest education level: Not on file   Occupational History    Not on file   Tobacco Use    Smoking status: Never    Smokeless tobacco: Never   Substance and Sexual Activity    Alcohol use: Yes     Comment: occ    Drug use: No    Sexual activity: Yes     Partners: Female   Other Topics Concern    Not on file   Social History Narrative    oing to school to get a degree in Social Work and she would eventually like to treat people with addictions at [employer] like the [employer]. Enrike Perez has been  to [facility] since 2004 and she is the Mother of Lizett Cam (2/12/94) and Juany (8/12/98). She does not smoke and drinks alcohol socially. 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  She is also g     Social Determinants of Health     Financial Resource Strain: Low Risk     Difficulty of Paying Living Expenses: Not hard at all   Food Insecurity: No Food Insecurity    Worried About Running Out of Food in the Last Year: Never true    Ran Out of Food in the Last Year: Never true   Transportation Needs: Not on file   Physical Activity: Not on file   Stress: Not on file   Social Connections: Not on file   Intimate Partner Violence: Not on file   Housing Stability: Not on file     Current Outpatient Medications   Medication Sig Dispense Refill    ipratropium (ATROVENT) 0.06 % nasal spray USE 2 SPRAY(S) IN EACH NOSTRIL 4 TIMES DAILY FOR 10 DAYS      predniSONE (DELTASONE) 20 MG tablet TAKE 1 TABLET BY MOUTH TWICE DAILY FOR 7 DAYS      cetirizine (ZYRTEC) 10 MG tablet TAKE 1 TABLET BY MOUTH ONCE DAILY FOR 30 DAYS      apixaban (ELIQUIS) 5 MG TABS tablet Take 1 tablet by mouth 2 times daily 60 tablet 2    diphenhydrAMINE HCl (BENADRYL ALLERGY PO) Take by mouth as needed      ELIQUIS DVT/PE STARTER PACK 5 MG TBPK tablet TAKE 2 (5MG) TABS TO EQUAL 10MG DOSE TWICE DAILY FOR 13 DOSES, THEN DECREASE TO 1 TABLET TWICE DAILY THEREAFTER (Patient not taking: Reported on 1/10/2023)       No current facility-administered medications for this visit. OBJECTIVE:  BP (!) 140/85   Pulse 65   Temp 98.6 °F (37 °C) (Oral)   Resp 20   Ht 5' 5\" (1.651 m)   Wt 271 lb 6.4 oz (123.1 kg)   SpO2 95%   BMI 45.16 kg/m²     Physical Exam:  Constitutional: Oriented to person, place, and time. Well-developed and well-nourished. HEENT: Normocephalic and atraumatic. Oropharynx is clear and moist.   Conjunctivae and EOM are normal. Pupils are equal, round, and reactive to light. No scleral icterus. Neck supple. No JVD present. No tracheal deviation present. No thyromegaly present. Lymph node No palpable submandibular, cervical, supraclavicular, axillary and inguinal lymph nodes. Skin Warm and dry. No bruising and no rash noted. No erythema. No pallor. Respiratory Effort normal and breath sounds normal.  No respiratory distress. No wheezes. No rales. No tenderness. CVS Normal rate, regular rhythm and normal heart sounds. Exam reveals no gallop, no friction and no rub. No murmur heard. Abdomen Soft. Bowel sounds are normal. Exhibits no distension. There is no tenderness. There is no rebound and no guarding. Neuro Normal reflexes. No cranial nerve deficit. Exhibits normal muscle tone, 5 of 5 strength of all extremities. MSK Normal range of motion in general.  No edema and no tenderness.    Psych Normal mood, affect, behavior, judgment and thought content      Labs:  Recent Results (from the past 24 hour(s))   CBC with Auto Differential    Collection Time: 01/10/23  3:34 PM   Result Value Ref Range    WBC 11.4 (H) 4.3 - 11.1 K/uL    RBC 4.70 4.05 - 5.2 M/uL    Hemoglobin 12.5 11.7 - 15.4 g/dL    Hematocrit 40.5 35.8 - 46.3 %    MCV 86.2 82.0 - 102.0 FL    MCH 26.6 26.1 - 32.9 PG    MCHC 30.9 (L) 31.4 - 35.0 g/dL    RDW 13.9 11.9 - 14.6 %    Platelets 924 819 - 571 K/uL    MPV 9.9 9.4 - 12.3 FL    nRBC 0.00 0.0 - 0.2 K/uL    Seg Neutrophils 83 (H) 43 - 78 %    Lymphocytes 14 13 - 44 %    Monocytes 2 (L) 4.0 - 12.0 %    Eosinophils % 0 (L) 0.5 - 7.8 %    Basophils 0 0.0 - 2.0 %    Immature Granulocytes 1 0.0 - 5.0 %    Segs Absolute 9.4 (H) 1.7 - 8.2 K/UL    Absolute Lymph # 1.6 0.5 - 4.6 K/UL    Absolute Mono # 0.3 0.1 - 1.3 K/UL    Absolute Eos # 0.0 0.0 - 0.8 K/UL    Basophils Absolute 0.0 0.0 - 0.2 K/UL    Absolute Immature Granulocyte 0.1 0.0 - 0.5 K/UL    Differential Type AUTOMATED     Comprehensive Metabolic Panel    Collection Time: 01/10/23  3:34 PM   Result Value Ref Range    Sodium 137 133 - 143 mmol/L    Potassium 3.9 3.5 - 5.1 mmol/L    Chloride 102 101 - 110 mmol/L    CO2 28 21 - 32 mmol/L    Anion Gap 7 2 - 11 mmol/L    Glucose 159 (H) 65 - 100 mg/dL    BUN 12 6 - 23 MG/DL    Creatinine 1.00 0.6 - 1.0 MG/DL    Est, Glom Filt Rate >60 >60 ml/min/1.73m2    Calcium 9.1 8.3 - 10.4 MG/DL    Total Bilirubin 0.2 0.2 - 1.1 MG/DL    ALT 20 12 - 65 U/L    AST 8 (L) 15 - 37 U/L    Alk Phosphatase 59 50 - 136 U/L    Total Protein 7.8 6.3 - 8.2 g/dL    Albumin 3.5 3.5 - 5.0 g/dL    Globulin 4.3 2.8 - 4.5 g/dL    Albumin/Globulin Ratio 0.8 0.4 - 1.6     Ferritin    Collection Time: 01/10/23  3:34 PM   Result Value Ref Range    Ferritin 245 8 - 388 NG/ML   Transferrin Saturation    Collection Time: 01/10/23  3:34 PM   Result Value Ref Range    Iron 44 35 - 150 ug/dL    TIBC 296 250 - 450 ug/dL    TRANSFERRIN SATURATION 15 (L) >20 %       Imaging:  No results found for this or any previous visit. ASSESSMENT/PLAN:   Diagnosis Orders   1. Iron deficiency anemia secondary to blood loss (chronic)        2. Body mass index (BMI) 45.0-49.9, adult ([facility]        3. Excessive and frequent menstruation with irregular cycle        4.  Acute pulmonary embolism, unspecified pulmonary embolism type, unspecified whether acute cor pulmonale present ([facility] 38 y.o.  F consulted for anemia and presented to [facility] on 10/14/2021. She has history of heavy menstrual periods and heavy bleeding had to be ablated to stop. She has been taking iron for years and tried different varieties labs still showed iron deficiency and anemia, arranged iron infusion, responded well, menstrual bleeding stopped after last ablation, scribed Integra for a month to build iron storage then stop, follow PCP and GYN for regular care and return when needed again. She returned on 1/10/2023, and recently being hospitalized in ICU for large PE that responded to anticoagulation, does not appear having obvious provoking factor, discussed to continue full anticoagulation for 6 months then we will plan for hypercoagulable work-up, also concern of her menstrual bleeding may worsen again and hysterectomy has been recommended by gynecology as last resort if needed to stop severe bleeding, are low again and arrange IV iron, continue Eliquis and return in 3 months to follow but call as needed. All questions are answered to her satisfaction. She will call for further questions and concerns. ECOG PERFORMANCE STATUS - 0-Fully active, able to carry on all pre-disease performance without restriction. Pain - 0 - No pain/10. None/Minimal pain - not affecting QOL     Fatigue - No flowsheet data found. Distress - No flowsheet data found. Elements of this note have been dictated via voice recognition software. Text and phrases may be limited by the accuracy and autoconversion of the software. The chart has been reviewed, but errors may still be present. Umu Bangura M.D.   [address]  Office : [phone]  Fax : [phone]

## 2023-01-16 ENCOUNTER — TELEPHONE (OUTPATIENT)
Dept: ONCOLOGY | Age: 43
End: 2023-01-16

## 2023-01-16 NOTE — TELEPHONE ENCOUNTER
Pt seen by Dr Alessia Martin for SCOTT and most recent PE. She is on Eliquis now for 6 months. Can she get a routine cleaning done. Msg to NP Pool.

## 2023-01-16 NOTE — TELEPHONE ENCOUNTER
Per NELLY Jones  routine cleaning while on Eliquis should be fine.    This msg will be [facility] Naida Martinez  [phone]  fax # [phone]

## 2023-01-16 NOTE — TELEPHONE ENCOUNTER
Kelsey from [address] [place] [address] called in regarding to PT needing a routine cleaning/wanting to know if cleared to get done/states PT zurdo was in hospital in December with a pulmonary embolism//Kelsey call back [phone]

## 2023-01-18 ENCOUNTER — TELEPHONE (OUTPATIENT)
Dept: FAMILY MEDICINE CLINIC | Facility: CLINIC | Age: 43
End: 2023-01-18

## 2023-01-18 NOTE — TELEPHONE ENCOUNTER
I called to tell her this. The left leg is slightly larger than the right but it is not hard or painful or warm like when she had the DVT. She has muscle aches for the last few days. Denies any other uri symptoms currently. She is compliant with her eliquis so unless symptoms worsen, I'd just f/u w/ heme/onc. If persistent swelling or pain tomorrow, call for an appt. Go to ER for any worsening symptoms.

## 2023-01-18 NOTE — TELEPHONE ENCOUNTER
I called the patient and spoke to her about this. She is having problems with the lower left leg hurting her, and states that the left leg is bigger that the other one. Is concerned because of PE and taking Eliquis.

## 2023-01-18 NOTE — TELEPHONE ENCOUNTER
----- Message from Gene Lee sent at 1/18/2023  8:31 AM EST -----  Subject: Message to Provider    QUESTIONS  Information for Provider? Pt requesting a call back in regards to her leg   pain.   ---------------------------------------------------------------------------  --------------  Paxton GROVES  [phone]; OK to leave message on voicemail  ---------------------------------------------------------------------------  --------------  SCRIPT ANSWERS  Relationship to Patient?  Self

## 2023-01-21 ENCOUNTER — HOSPITAL ENCOUNTER (OUTPATIENT)
Dept: INFUSION THERAPY | Age: 43
Discharge: HOME | End: 2023-01-21
Payer: COMMERCIAL

## 2023-01-21 VITALS
TEMPERATURE: 97.8 F | DIASTOLIC BLOOD PRESSURE: 92 MMHG | SYSTOLIC BLOOD PRESSURE: 158 MMHG | OXYGEN SATURATION: 98 % | RESPIRATION RATE: 16 BRPM | HEART RATE: 77 BPM

## 2023-01-21 DIAGNOSIS — D50.0 IRON DEFICIENCY ANEMIA DUE TO CHRONIC BLOOD LOSS: Primary | ICD-10-CM

## 2023-01-21 PROCEDURE — 2580000003 HC RX 258: Performed by: INTERNAL MEDICINE

## 2023-01-21 PROCEDURE — 96365 THER/PROPH/DIAG IV INF INIT: CPT

## 2023-01-21 PROCEDURE — 6360000002 HC RX W HCPCS: Performed by: INTERNAL MEDICINE

## 2023-01-21 RX ORDER — SODIUM CHLORIDE 0.9 % (FLUSH) 0.9 %
5-40 SYRINGE (ML) INJECTION PRN
Status: DISCONTINUED | OUTPATIENT
Start: 2023-01-21 | End: 2023-01-22 | Stop reason: HOSPADM

## 2023-01-21 RX ORDER — SODIUM CHLORIDE 9 MG/ML
INJECTION, SOLUTION INTRAVENOUS CONTINUOUS
Status: CANCELLED | OUTPATIENT
Start: 2023-01-21

## 2023-01-21 RX ORDER — EPINEPHRINE 1 MG/ML
0.3 INJECTION, SOLUTION, CONCENTRATE INTRAVENOUS PRN
Status: DISCONTINUED | OUTPATIENT
Start: 2023-01-21 | End: 2023-01-22 | Stop reason: HOSPADM

## 2023-01-21 RX ORDER — ALBUTEROL SULFATE 90 UG/1
4 AEROSOL, METERED RESPIRATORY (INHALATION) PRN
Status: DISCONTINUED | OUTPATIENT
Start: 2023-01-21 | End: 2023-01-22 | Stop reason: HOSPADM

## 2023-01-21 RX ORDER — ONDANSETRON 2 MG/ML
8 INJECTION INTRAMUSCULAR; INTRAVENOUS
Status: CANCELLED | OUTPATIENT
Start: 2023-01-21

## 2023-01-21 RX ORDER — ONDANSETRON 2 MG/ML
8 INJECTION INTRAMUSCULAR; INTRAVENOUS
Status: DISCONTINUED | OUTPATIENT
Start: 2023-01-21 | End: 2023-01-22 | Stop reason: HOSPADM

## 2023-01-21 RX ORDER — SODIUM CHLORIDE 9 MG/ML
5-250 INJECTION, SOLUTION INTRAVENOUS PRN
Status: CANCELLED | OUTPATIENT
Start: 2023-01-21

## 2023-01-21 RX ORDER — DIPHENHYDRAMINE HYDROCHLORIDE 50 MG/ML
50 INJECTION INTRAMUSCULAR; INTRAVENOUS
Status: CANCELLED | OUTPATIENT
Start: 2023-01-21

## 2023-01-21 RX ORDER — HEPARIN SODIUM (PORCINE) LOCK FLUSH IV SOLN 100 UNIT/ML 100 UNIT/ML
500 SOLUTION INTRAVENOUS PRN
Status: CANCELLED | OUTPATIENT
Start: 2023-01-21

## 2023-01-21 RX ORDER — EPINEPHRINE 1 MG/ML
0.3 INJECTION, SOLUTION, CONCENTRATE INTRAVENOUS PRN
Status: CANCELLED | OUTPATIENT
Start: 2023-01-21

## 2023-01-21 RX ORDER — SODIUM CHLORIDE 9 MG/ML
INJECTION, SOLUTION INTRAVENOUS CONTINUOUS
Status: DISCONTINUED | OUTPATIENT
Start: 2023-01-21 | End: 2023-01-22 | Stop reason: HOSPADM

## 2023-01-21 RX ORDER — SODIUM CHLORIDE 9 MG/ML
5-250 INJECTION, SOLUTION INTRAVENOUS PRN
Status: DISCONTINUED | OUTPATIENT
Start: 2023-01-21 | End: 2023-01-22 | Stop reason: HOSPADM

## 2023-01-21 RX ORDER — ACETAMINOPHEN 325 MG/1
650 TABLET ORAL
Status: DISCONTINUED | OUTPATIENT
Start: 2023-01-21 | End: 2023-01-22 | Stop reason: HOSPADM

## 2023-01-21 RX ORDER — HEPARIN SODIUM (PORCINE) LOCK FLUSH IV SOLN 100 UNIT/ML 100 UNIT/ML
500 SOLUTION INTRAVENOUS PRN
Status: DISCONTINUED | OUTPATIENT
Start: 2023-01-21 | End: 2023-01-22 | Stop reason: HOSPADM

## 2023-01-21 RX ORDER — ALBUTEROL SULFATE 90 UG/1
4 AEROSOL, METERED RESPIRATORY (INHALATION) PRN
Status: CANCELLED | OUTPATIENT
Start: 2023-01-21

## 2023-01-21 RX ORDER — SODIUM CHLORIDE 0.9 % (FLUSH) 0.9 %
5-40 SYRINGE (ML) INJECTION PRN
Status: CANCELLED | OUTPATIENT
Start: 2023-01-21

## 2023-01-21 RX ORDER — ACETAMINOPHEN 325 MG/1
650 TABLET ORAL
Status: CANCELLED | OUTPATIENT
Start: 2023-01-21

## 2023-01-21 RX ORDER — DIPHENHYDRAMINE HYDROCHLORIDE 50 MG/ML
50 INJECTION INTRAMUSCULAR; INTRAVENOUS
Status: DISCONTINUED | OUTPATIENT
Start: 2023-01-21 | End: 2023-01-22 | Stop reason: HOSPADM

## 2023-01-21 RX ADMIN — FERRIC CARBOXYMALTOSE INJECTION 750 MG: 50 INJECTION, SOLUTION INTRAVENOUS at 10:23

## 2023-01-21 RX ADMIN — SODIUM CHLORIDE 100 ML/HR: 9 INJECTION, SOLUTION INTRAVENOUS at 09:50

## 2023-01-21 RX ADMIN — SODIUM CHLORIDE, PRESERVATIVE FREE 10 ML: 5 INJECTION INTRAVENOUS at 09:50

## 2023-01-21 NOTE — PROGRESS NOTES
Arrived to the [facility]. Injectafer completed. Patient tolerated without difficulty. Any issues or concerns during appointment: NOne. Patient aware of next infusion appointment on Jan 28 (date) at 0900 (time). Patient instructed to call provider with temperature of 100.4 or greater or nausea/vomiting/ diarrhea or pain not controlled by medications  Discharged ambulatory.

## 2023-01-23 ENCOUNTER — TELEPHONE (OUTPATIENT)
Dept: FAMILY MEDICINE CLINIC | Facility: CLINIC | Age: 43
End: 2023-01-23

## 2023-01-23 RX ORDER — LOSARTAN POTASSIUM 25 MG/1
25 TABLET ORAL DAILY
Qty: 30 TABLET | Refills: 0 | Status: SHIPPED | OUTPATIENT
Start: 2023-01-23

## 2023-01-23 NOTE — TELEPHONE ENCOUNTER
Patient called and left a message stating that she has been having headaches since Thursday. She stated that on Saturday at her Iron infusion, her blood pressure was 158/92.

## 2023-01-23 NOTE — TELEPHONE ENCOUNTER
We should go ahead and treat for hypertension which has resulted since her illness in December. Please ask her to return in 2 weeks for IOV w/ labs.

## 2023-01-24 ENCOUNTER — TELEPHONE (OUTPATIENT)
Dept: FAMILY MEDICINE CLINIC | Facility: CLINIC | Age: 43
End: 2023-01-24

## 2023-01-24 NOTE — TELEPHONE ENCOUNTER
----- Message from Joy Jose Cruz sent at 1/23/2023  4:59 PM EST -----  Subject: Message to Provider    QUESTIONS  Information for Provider? patient has FMLA paperwork that she would like   to have filled out, would like to know if she needs an appointment for it  ---------------------------------------------------------------------------  --------------  [id number] [employer]  [phone]; OK to leave message on voicemail  ---------------------------------------------------------------------------  --------------  SCRIPT ANSWERS  Relationship to Patient?  Self Metabolic screening could not be completed due to the patient's enduring unstable medical/psychological condition

## 2023-01-24 NOTE — TELEPHONE ENCOUNTER
Yes, she should make a VV to discuss as she has been out for some time and we need specific details to appropriately complete this.

## 2023-01-24 NOTE — TELEPHONE ENCOUNTER
I called the patient and spoke to her about this. A virtual visit was been set up for 1-26-23 and patient will be bringing the paper work by tomorrow.

## 2023-01-26 ENCOUNTER — TELEMEDICINE (OUTPATIENT)
Dept: FAMILY MEDICINE CLINIC | Facility: CLINIC | Age: 43
End: 2023-01-26
Payer: COMMERCIAL

## 2023-01-26 DIAGNOSIS — N92.1 MENOMETRORRHAGIA: Primary | ICD-10-CM

## 2023-01-26 DIAGNOSIS — I26.99 PE (PULMONARY THROMBOEMBOLISM) (HCC): ICD-10-CM

## 2023-01-26 PROCEDURE — 99214 OFFICE O/P EST MOD 30 MIN: CPT | Performed by: FAMILY MEDICINE

## 2023-01-26 ASSESSMENT — ENCOUNTER SYMPTOMS
SHORTNESS OF BREATH: 0
COUGH: 0
DIARRHEA: 0
CONSTIPATION: 0
VOMITING: 0

## 2023-01-26 NOTE — PROGRESS NOTES
Kennedy Marley (:  1980) is a Established patient, here for evaluation of the following:    Assessment & Plan   Below is the assessment and plan developed based on review of pertinent history, physical exam, labs, studies, and medications. 1. Menometrorrhagia  2. PE (pulmonary thromboembolism) ([facility]    Filled out fmla paperwork. Already has f/u appt scheculed . No follow-ups on file. Subjective   HPI  Chief Complaint   Patient presents with    Other     FMLA   Beginning of December got ill with an upper respiratory infection. Hospitalized the  or . Her first day missed was 22. Went back 1/3/23    She has asked for intermittent FMLA to allow for doctor's visits and sometimes feels ill and has to go home. Just feeling weak. The week of her period she has extremely heavy periods and cannot leave the house. Review of Systems   Constitutional:  Positive for fatigue. Negative for diaphoresis and unexpected weight change. HENT:  Negative for congestion. Eyes:  Negative for visual disturbance. Respiratory:  Negative for cough and shortness of breath. Cardiovascular:  Negative for chest pain. Gastrointestinal:  Negative for constipation, diarrhea and vomiting. Genitourinary:  Negative for dysuria. Neurological:  Positive for weakness. Negative for headaches. Psychiatric/Behavioral:  Negative for dysphoric mood and sleep disturbance. The patient is not nervous/anxious.          Objective     Physical Exam  [INSTRUCTIONS:  \"[x]\" Indicates a positive item  \"[]\" Indicates a negative item  -- DELETE ALL ITEMS NOT EXAMINED]    Constitutional: [x] Appears well-developed and well-nourished [x] No apparent distress      [] Abnormal -     Mental status: [x] Alert and awake  [x] Oriented to person/place/time [x] Able to follow commands    [] Abnormal -     Eyes:   EOM    [x]  Normal    [] Abnormal -   Sclera  [x]  Normal    [] Abnormal - Discharge [x]  None visible   [] Abnormal -     HENT: [x] Normocephalic, atraumatic  [] Abnormal -   [x] Mouth/Throat: Mucous membranes are moist    External Ears [x] Normal  [] Abnormal -    Neck: [x] No visualized mass [] Abnormal -     Pulmonary/Chest: [x] Respiratory effort normal   [x] No visualized signs of difficulty breathing or respiratory distress        [] Abnormal -      Musculoskeletal:   [x] Normal gait with no signs of ataxia         [x] Normal range of motion of neck        [] Abnormal -     Neurological:        [x] No Facial Asymmetry (Cranial nerve 7 motor function) (limited exam due to video visit)          [x] No gaze palsy        [] Abnormal -          Skin:        [x] No significant exanthematous lesions or discoloration noted on facial skin         [] Abnormal -            Psychiatric:       [x] Normal Affect [] Abnormal -        [x] No Hallucinations    Other pertinent observable physical exam findings:-       Patient-Reported Vitals 1/6/2023   Patient-Reported Weight 265   Patient-Reported Height 5'5\"      Patient-Reported Vitals  No data recorded        Gurpreet Baca was evaluated through a synchronous (real-time) audio-video encounter. The patient (or guardian if applicable) is aware that this is a billable service, which includes applicable co-pays. This Virtual Visit was conducted with patient's (and/or legal guardian's) consent. The visit was conducted pursuant to the emergency declaration under the [address] [place] [address], [address] [place] [address] authority and the [employer] General Act. Patient identification was verified, and a caregiver was present when appropriate. The patient was located at Home: [address] [place] [id number].    Provider was located at Home ([facility]): Claire Ruano MD

## 2023-01-28 ENCOUNTER — HOSPITAL ENCOUNTER (OUTPATIENT)
Dept: INFUSION THERAPY | Age: 43
End: 2023-01-28

## 2023-02-07 ENCOUNTER — OFFICE VISIT (OUTPATIENT)
Dept: FAMILY MEDICINE CLINIC | Facility: CLINIC | Age: 43
End: 2023-02-07
Payer: COMMERCIAL

## 2023-02-07 VITALS
TEMPERATURE: 96.8 F | OXYGEN SATURATION: 100 % | HEART RATE: 70 BPM | WEIGHT: 266 LBS | DIASTOLIC BLOOD PRESSURE: 80 MMHG | RESPIRATION RATE: 19 BRPM | HEIGHT: 65 IN | SYSTOLIC BLOOD PRESSURE: 127 MMHG | BODY MASS INDEX: 44.32 KG/M2

## 2023-02-07 DIAGNOSIS — I10 PRIMARY HYPERTENSION: Primary | ICD-10-CM

## 2023-02-07 PROCEDURE — 3074F SYST BP LT 130 MM HG: CPT | Performed by: FAMILY MEDICINE

## 2023-02-07 PROCEDURE — 3078F DIAST BP <80 MM HG: CPT | Performed by: FAMILY MEDICINE

## 2023-02-07 PROCEDURE — 99213 OFFICE O/P EST LOW 20 MIN: CPT | Performed by: FAMILY MEDICINE

## 2023-02-07 RX ORDER — LOSARTAN POTASSIUM 50 MG/1
50 TABLET ORAL DAILY
Qty: 90 TABLET | Refills: 1 | Status: SHIPPED | OUTPATIENT
Start: 2023-02-07

## 2023-02-07 SDOH — ECONOMIC STABILITY: INCOME INSECURITY: HOW HARD IS IT FOR YOU TO PAY FOR THE VERY BASICS LIKE FOOD, HOUSING, MEDICAL CARE, AND HEATING?: NOT HARD AT ALL

## 2023-02-07 SDOH — ECONOMIC STABILITY: HOUSING INSECURITY
IN THE LAST 12 MONTHS, WAS THERE A TIME WHEN YOU DID NOT HAVE A STEADY PLACE TO SLEEP OR SLEPT IN A SHELTER (INCLUDING NOW)?: NO

## 2023-02-07 SDOH — ECONOMIC STABILITY: FOOD INSECURITY: WITHIN THE PAST 12 MONTHS, THE FOOD YOU BOUGHT JUST DIDN'T LAST AND YOU DIDN'T HAVE MONEY TO GET MORE.: NEVER TRUE

## 2023-02-07 SDOH — ECONOMIC STABILITY: FOOD INSECURITY: WITHIN THE PAST 12 MONTHS, YOU WORRIED THAT YOUR FOOD WOULD RUN OUT BEFORE YOU GOT MONEY TO BUY MORE.: NEVER TRUE

## 2023-02-07 ASSESSMENT — PATIENT HEALTH QUESTIONNAIRE - PHQ9
8. MOVING OR SPEAKING SO SLOWLY THAT OTHER PEOPLE COULD HAVE NOTICED. OR THE OPPOSITE, BEING SO FIGETY OR RESTLESS THAT YOU HAVE BEEN MOVING AROUND A LOT MORE THAN USUAL: 0
2. FEELING DOWN, DEPRESSED OR HOPELESS: 0
9. THOUGHTS THAT YOU WOULD BE BETTER OFF DEAD, OR OF HURTING YOURSELF: 0
SUM OF ALL RESPONSES TO PHQ QUESTIONS 1-9: 0
3. TROUBLE FALLING OR STAYING ASLEEP: 0
5. POOR APPETITE OR OVEREATING: 0
1. LITTLE INTEREST OR PLEASURE IN DOING THINGS: 0
10. IF YOU CHECKED OFF ANY PROBLEMS, HOW DIFFICULT HAVE THESE PROBLEMS MADE IT FOR YOU TO DO YOUR WORK, TAKE CARE OF THINGS AT HOME, OR GET ALONG WITH OTHER PEOPLE: 0
4. FEELING TIRED OR HAVING LITTLE ENERGY: 0
SUM OF ALL RESPONSES TO PHQ QUESTIONS 1-9: 0
6. FEELING BAD ABOUT YOURSELF - OR THAT YOU ARE A FAILURE OR HAVE LET YOURSELF OR YOUR FAMILY DOWN: 0
SUM OF ALL RESPONSES TO PHQ QUESTIONS 1-9: 0
SUM OF ALL RESPONSES TO PHQ QUESTIONS 1-9: 0
7. TROUBLE CONCENTRATING ON THINGS, SUCH AS READING THE NEWSPAPER OR WATCHING TELEVISION: 0
SUM OF ALL RESPONSES TO PHQ9 QUESTIONS 1 & 2: 0

## 2023-02-07 ASSESSMENT — ENCOUNTER SYMPTOMS
SHORTNESS OF BREATH: 0
DIARRHEA: 0
COUGH: 0
VOMITING: 0
CONSTIPATION: 0

## 2023-02-07 NOTE — PROGRESS NOTES
Hiwot Narvaez (:  1980) is a 37 y.o. female,Established patient, here for evaluation of the following chief complaint(s):  Follow-up (On blood pressure and adding on Losartan. Head is not hurting as mush as it was, but still hurts sometimes.) and Other (Has been over 10 years since the last Tdap shot.)         ASSESSMENT/PLAN:  1. Primary hypertension  -     Basic Metabolic Panel; Future  Checking kidney function. Continue to monitor bp at home. No follow-ups on file. Subjective   SUBJECTIVE/OBJECTIVE:  HPI  Chief Complaint   Patient presents with    Follow-up     On blood pressure and adding on Losartan. Head is not hurting as mush as it was, but still hurts sometimes. Other     Has been over 10 years since the last Tdap shot. Still feeling very low energy despite iron infusions. She still has very heavy periods but is on blood thinners so can't really do a hysterectomy. Bp is improved but she has headaches still worse in the evening. Review of Systems   Constitutional:  Negative for diaphoresis and unexpected weight change. HENT:  Negative for congestion. Eyes:  Negative for visual disturbance. Respiratory:  Negative for cough and shortness of breath. Cardiovascular:  Negative for chest pain. Gastrointestinal:  Negative for constipation, diarrhea and vomiting. Genitourinary:  Negative for dysuria. Neurological:  Positive for headaches. Psychiatric/Behavioral:  Negative for dysphoric mood and sleep disturbance. The patient is not nervous/anxious. Objective   Physical Exam  Vitals reviewed. Constitutional:       Appearance: Normal appearance. She is obese. HENT:      Head: Normocephalic. Eyes:      Extraocular Movements: Extraocular movements intact. Conjunctiva/sclera: Conjunctivae normal.      Pupils: Pupils are equal, round, and reactive to light. Cardiovascular:      Rate and Rhythm: Normal rate and regular rhythm. Heart sounds: Normal heart sounds. No murmur heard. Pulmonary:      Effort: Pulmonary effort is normal. No respiratory distress. Breath sounds: Normal breath sounds. Abdominal:      General: Bowel sounds are normal.      Palpations: Abdomen is soft. Musculoskeletal:         General: Normal range of motion. Skin:     General: Skin is warm and dry. Neurological:      General: No focal deficit present. Mental Status: She is alert. Psychiatric:         Mood and Affect: Mood normal.         Behavior: Behavior normal.   /80 (Site: Left Upper Arm, Position: Sitting, Cuff Size: Thigh)   Pulse 70   Temp 96.8 °F (36 °C)   Resp 19   Ht 5' 5\" (1.651 m)   Wt 266 lb (120.7 kg)   SpO2 100%   BMI 44.26 kg/m²        An electronic signature was used to authenticate this note.     --Pritesh Morales MD

## 2023-02-08 LAB
ANION GAP SERPL CALC-SCNC: 5 MMOL/L (ref 2–11)
BUN SERPL-MCNC: 13 MG/DL (ref 6–23)
CALCIUM SERPL-MCNC: 9.3 MG/DL (ref 8.3–10.4)
CHLORIDE SERPL-SCNC: 110 MMOL/L (ref 101–110)
CO2 SERPL-SCNC: 24 MMOL/L (ref 21–32)
CREAT SERPL-MCNC: 0.8 MG/DL (ref 0.6–1)
GLUCOSE SERPL-MCNC: 89 MG/DL (ref 65–100)
POTASSIUM SERPL-SCNC: 4.5 MMOL/L (ref 3.5–5.1)
SODIUM SERPL-SCNC: 139 MMOL/L (ref 133–143)

## 2023-02-09 ENCOUNTER — TRANSCRIBE ORDERS (OUTPATIENT)
Dept: SCHEDULING | Age: 43
End: 2023-02-09

## 2023-02-09 DIAGNOSIS — Z12.31 SCREENING MAMMOGRAM FOR HIGH-RISK PATIENT: Primary | ICD-10-CM

## 2023-02-21 ENCOUNTER — PATIENT MESSAGE (OUTPATIENT)
Dept: FAMILY MEDICINE CLINIC | Facility: CLINIC | Age: 43
End: 2023-02-21

## 2023-02-21 NOTE — TELEPHONE ENCOUNTER
From: Chencho Snowball  To: Dr. Andre Daly: 2/21/2023 12:38 PM EST  Subject: FMLA    Hi,    Can you please advise if this is what you wrote ? It just doesn't appear to be the 3 days we discussed .      Thanks

## 2023-03-04 ENCOUNTER — HOSPITAL ENCOUNTER (OUTPATIENT)
Dept: MAMMOGRAPHY | Age: 43
End: 2023-03-04
Payer: COMMERCIAL

## 2023-03-04 DIAGNOSIS — Z12.31 SCREENING MAMMOGRAM FOR HIGH-RISK PATIENT: ICD-10-CM

## 2023-03-04 PROCEDURE — 77063 BREAST TOMOSYNTHESIS BI: CPT

## 2023-04-18 ENCOUNTER — OFFICE VISIT (OUTPATIENT)
Dept: ONCOLOGY | Age: 43
End: 2023-04-18
Payer: COMMERCIAL

## 2023-04-18 ENCOUNTER — HOSPITAL ENCOUNTER (OUTPATIENT)
Dept: LAB | Age: 43
Discharge: HOME | End: 2023-04-21
Payer: COMMERCIAL

## 2023-04-18 VITALS
HEIGHT: 65 IN | SYSTOLIC BLOOD PRESSURE: 121 MMHG | HEART RATE: 65 BPM | RESPIRATION RATE: 16 BRPM | TEMPERATURE: 98.3 F | OXYGEN SATURATION: 99 % | DIASTOLIC BLOOD PRESSURE: 61 MMHG | WEIGHT: 272.1 LBS | BODY MASS INDEX: 45.33 KG/M2

## 2023-04-18 DIAGNOSIS — D50.0 IRON DEFICIENCY ANEMIA SECONDARY TO BLOOD LOSS (CHRONIC): ICD-10-CM

## 2023-04-18 DIAGNOSIS — Z86.711 HISTORY OF PULMONARY EMBOLISM: Primary | ICD-10-CM

## 2023-04-18 LAB
ALBUMIN SERPL-MCNC: 3.5 G/DL (ref 3.5–5)
ALBUMIN/GLOB SERPL: 0.9 (ref 0.4–1.6)
ALP SERPL-CCNC: 56 U/L (ref 50–136)
ALT SERPL-CCNC: 23 U/L (ref 12–65)
ANION GAP SERPL CALC-SCNC: 5 MMOL/L (ref 2–11)
AST SERPL-CCNC: 18 U/L (ref 15–37)
BASOPHILS # BLD: 0.1 K/UL (ref 0–0.2)
BASOPHILS NFR BLD: 1 % (ref 0–2)
BILIRUB SERPL-MCNC: 0.4 MG/DL (ref 0.2–1.1)
BUN SERPL-MCNC: 11 MG/DL (ref 6–23)
CALCIUM SERPL-MCNC: 8.8 MG/DL (ref 8.3–10.4)
CHLORIDE SERPL-SCNC: 109 MMOL/L (ref 101–110)
CO2 SERPL-SCNC: 26 MMOL/L (ref 21–32)
CREAT SERPL-MCNC: 0.8 MG/DL (ref 0.6–1)
DIFFERENTIAL METHOD BLD: ABNORMAL
EOSINOPHIL # BLD: 0.2 K/UL (ref 0–0.8)
EOSINOPHIL NFR BLD: 4 % (ref 0.5–7.8)
ERYTHROCYTE [DISTWIDTH] IN BLOOD BY AUTOMATED COUNT: 13.3 % (ref 11.9–14.6)
FERRITIN SERPL-MCNC: 485 NG/ML (ref 8–388)
GLOBULIN SER CALC-MCNC: 4 G/DL (ref 2.8–4.5)
GLUCOSE SERPL-MCNC: 96 MG/DL (ref 65–100)
HCT VFR BLD AUTO: 39.9 % (ref 35.8–46.3)
HGB BLD-MCNC: 12.4 G/DL (ref 11.7–15.4)
IMM GRANULOCYTES # BLD AUTO: 0 K/UL (ref 0–0.5)
IMM GRANULOCYTES NFR BLD AUTO: 0 % (ref 0–5)
IRON SATN MFR SERPL: 18 %
IRON SERPL-MCNC: 49 UG/DL (ref 35–150)
LYMPHOCYTES # BLD: 2.8 K/UL (ref 0.5–4.6)
LYMPHOCYTES NFR BLD: 46 % (ref 13–44)
MCH RBC QN AUTO: 27.1 PG (ref 26.1–32.9)
MCHC RBC AUTO-ENTMCNC: 31.1 G/DL (ref 31.4–35)
MCV RBC AUTO: 87.3 FL (ref 82–102)
MONOCYTES # BLD: 0.5 K/UL (ref 0.1–1.3)
MONOCYTES NFR BLD: 8 % (ref 4–12)
NEUTS SEG # BLD: 2.6 K/UL (ref 1.7–8.2)
NEUTS SEG NFR BLD: 41 % (ref 43–78)
NRBC # BLD: 0 K/UL (ref 0–0.2)
PLATELET # BLD AUTO: 309 K/UL (ref 150–450)
PMV BLD AUTO: 9.9 FL (ref 9.4–12.3)
POTASSIUM SERPL-SCNC: 4 MMOL/L (ref 3.5–5.1)
PROT SERPL-MCNC: 7.5 G/DL (ref 6.3–8.2)
RBC # BLD AUTO: 4.57 M/UL (ref 4.05–5.2)
SODIUM SERPL-SCNC: 140 MMOL/L (ref 133–143)
TIBC SERPL-MCNC: 265 UG/DL (ref 250–450)
WBC # BLD AUTO: 6.2 K/UL (ref 4.3–11.1)

## 2023-04-18 PROCEDURE — 83550 IRON BINDING TEST: CPT

## 2023-04-18 PROCEDURE — 80053 COMPREHEN METABOLIC PANEL: CPT

## 2023-04-18 PROCEDURE — 85025 COMPLETE CBC W/AUTO DIFF WBC: CPT

## 2023-04-18 PROCEDURE — 83540 ASSAY OF IRON: CPT

## 2023-04-18 PROCEDURE — 82728 ASSAY OF FERRITIN: CPT

## 2023-04-18 PROCEDURE — 36415 COLL VENOUS BLD VENIPUNCTURE: CPT

## 2023-04-18 PROCEDURE — 99214 OFFICE O/P EST MOD 30 MIN: CPT | Performed by: INTERNAL MEDICINE

## 2023-04-18 RX ORDER — HEPARIN SODIUM (PORCINE) LOCK FLUSH IV SOLN 100 UNIT/ML 100 UNIT/ML
500 SOLUTION INTRAVENOUS PRN
OUTPATIENT
Start: 2023-04-25

## 2023-04-18 RX ORDER — SODIUM CHLORIDE 9 MG/ML
5-250 INJECTION, SOLUTION INTRAVENOUS PRN
OUTPATIENT
Start: 2023-04-25

## 2023-04-18 RX ORDER — FAMOTIDINE 10 MG/ML
20 INJECTION, SOLUTION INTRAVENOUS
OUTPATIENT
Start: 2023-04-25

## 2023-04-18 RX ORDER — EPINEPHRINE 1 MG/ML
0.3 INJECTION, SOLUTION, CONCENTRATE INTRAVENOUS PRN
OUTPATIENT
Start: 2023-04-25

## 2023-04-18 RX ORDER — DIPHENHYDRAMINE HYDROCHLORIDE 50 MG/ML
50 INJECTION INTRAMUSCULAR; INTRAVENOUS
OUTPATIENT
Start: 2023-04-25

## 2023-04-18 RX ORDER — SODIUM CHLORIDE 9 MG/ML
INJECTION, SOLUTION INTRAVENOUS CONTINUOUS
OUTPATIENT
Start: 2023-04-25

## 2023-04-18 RX ORDER — ALBUTEROL SULFATE 90 UG/1
4 AEROSOL, METERED RESPIRATORY (INHALATION) PRN
OUTPATIENT
Start: 2023-04-25

## 2023-04-18 RX ORDER — ONDANSETRON 2 MG/ML
8 INJECTION INTRAMUSCULAR; INTRAVENOUS
OUTPATIENT
Start: 2023-04-25

## 2023-04-18 RX ORDER — ACETAMINOPHEN 325 MG/1
650 TABLET ORAL
OUTPATIENT
Start: 2023-04-25

## 2023-04-18 RX ORDER — SODIUM CHLORIDE 0.9 % (FLUSH) 0.9 %
5-40 SYRINGE (ML) INJECTION PRN
OUTPATIENT
Start: 2023-04-25

## 2023-04-18 NOTE — PROGRESS NOTES
102.0 FL    MCH 27.1 26.1 - 32.9 PG    MCHC 31.1 (L) 31.4 - 35.0 g/dL    RDW 13.3 11.9 - 14.6 %    Platelets 301 973 - 278 K/uL    MPV 9.9 9.4 - 12.3 FL    nRBC 0.00 0.0 - 0.2 K/uL    Seg Neutrophils 41 (L) 43 - 78 %    Lymphocytes 46 (H) 13 - 44 %    Monocytes 8 4.0 - 12.0 %    Eosinophils % 4 0.5 - 7.8 %    Basophils 1 0.0 - 2.0 %    Immature Granulocytes 0 0.0 - 5.0 %    Segs Absolute 2.6 1.7 - 8.2 K/UL    Absolute Lymph # 2.8 0.5 - 4.6 K/UL    Absolute Mono # 0.5 0.1 - 1.3 K/UL    Absolute Eos # 0.2 0.0 - 0.8 K/UL    Basophils Absolute 0.1 0.0 - 0.2 K/UL    Absolute Immature Granulocyte 0.0 0.0 - 0.5 K/UL    Differential Type AUTOMATED     Comprehensive Metabolic Panel    Collection Time: 04/18/23  2:39 PM   Result Value Ref Range    Sodium 140 133 - 143 mmol/L    Potassium 4.0 3.5 - 5.1 mmol/L    Chloride 109 101 - 110 mmol/L    CO2 26 21 - 32 mmol/L    Anion Gap 5 2 - 11 mmol/L    Glucose 96 65 - 100 mg/dL    BUN 11 6 - 23 MG/DL    Creatinine 0.80 0.6 - 1.0 MG/DL    Est, Glom Filt Rate >60 >60 ml/min/1.73m2    Calcium 8.8 8.3 - 10.4 MG/DL    Total Bilirubin 0.4 0.2 - 1.1 MG/DL    ALT 23 12 - 65 U/L    AST 18 15 - 37 U/L    Alk Phosphatase 56 50 - 136 U/L    Total Protein 7.5 6.3 - 8.2 g/dL    Albumin 3.5 3.5 - 5.0 g/dL    Globulin 4.0 2.8 - 4.5 g/dL    Albumin/Globulin Ratio 0.9 0.4 - 1.6     Ferritin    Collection Time: 04/18/23  2:39 PM   Result Value Ref Range    Ferritin 485 (H) 8 - 388 NG/ML   Transferrin Saturation    Collection Time: 04/18/23  2:39 PM   Result Value Ref Range    Iron 49 35 - 150 ug/dL    TIBC 265 250 - 450 ug/dL    TRANSFERRIN SATURATION 18 (L) >20 %       Imaging:  No results found for this or any previous visit. ASSESSMENT/PLAN:   Diagnosis Orders   1.  History of pulmonary embolism  Activated Protein C Resistance    Antiphospholipid Antibody Panel    Antithrombin 3 Activity    Antithrombin Panel    Beta-2 GlycoProtein 1 Ab,IGG & IGM    D-Dimer, Quantitative    Factor 2 Activity

## 2023-04-18 NOTE — PATIENT INSTRUCTIONS
[phone] if you have any  of the following symptoms:   Fever of 100.5 or greater  Chills  Shortness of breath  Swelling or pain in one leg    After office hours an answering service is available and will contact a provider for emergencies or if you are experiencing any of the above symptoms. Patient does express an interest in My Chart. My Chart log in information explained on the after visit summary printout at the [facility] desk.     Jaclyn Flores RN

## 2023-04-25 ENCOUNTER — HOSPITAL ENCOUNTER (OUTPATIENT)
Dept: INFUSION THERAPY | Age: 43
Discharge: HOME | End: 2023-04-25
Payer: COMMERCIAL

## 2023-04-25 VITALS
OXYGEN SATURATION: 97 % | HEART RATE: 68 BPM | SYSTOLIC BLOOD PRESSURE: 110 MMHG | TEMPERATURE: 97.6 F | RESPIRATION RATE: 16 BRPM | DIASTOLIC BLOOD PRESSURE: 58 MMHG

## 2023-04-25 DIAGNOSIS — D50.0 IRON DEFICIENCY ANEMIA DUE TO CHRONIC BLOOD LOSS: Primary | ICD-10-CM

## 2023-04-25 PROCEDURE — 2580000003 HC RX 258: Performed by: INTERNAL MEDICINE

## 2023-04-25 PROCEDURE — 6360000002 HC RX W HCPCS: Performed by: INTERNAL MEDICINE

## 2023-04-25 PROCEDURE — 96365 THER/PROPH/DIAG IV INF INIT: CPT

## 2023-04-25 RX ORDER — DIPHENHYDRAMINE HYDROCHLORIDE 50 MG/ML
50 INJECTION INTRAMUSCULAR; INTRAVENOUS
Status: DISCONTINUED | OUTPATIENT
Start: 2023-04-25 | End: 2023-04-26 | Stop reason: HOSPADM

## 2023-04-25 RX ORDER — ONDANSETRON 2 MG/ML
8 INJECTION INTRAMUSCULAR; INTRAVENOUS
OUTPATIENT
Start: 2023-04-29

## 2023-04-25 RX ORDER — ACETAMINOPHEN 325 MG/1
650 TABLET ORAL
OUTPATIENT
Start: 2023-04-29

## 2023-04-25 RX ORDER — ACETAMINOPHEN 325 MG/1
650 TABLET ORAL
Status: DISCONTINUED | OUTPATIENT
Start: 2023-04-25 | End: 2023-04-26 | Stop reason: HOSPADM

## 2023-04-25 RX ORDER — SODIUM CHLORIDE 9 MG/ML
5-250 INJECTION, SOLUTION INTRAVENOUS PRN
OUTPATIENT
Start: 2023-04-29

## 2023-04-25 RX ORDER — DIPHENHYDRAMINE HYDROCHLORIDE 50 MG/ML
50 INJECTION INTRAMUSCULAR; INTRAVENOUS
OUTPATIENT
Start: 2023-04-29

## 2023-04-25 RX ORDER — ALBUTEROL SULFATE 90 UG/1
4 AEROSOL, METERED RESPIRATORY (INHALATION) PRN
OUTPATIENT
Start: 2023-04-29

## 2023-04-25 RX ORDER — ALBUTEROL SULFATE 90 UG/1
4 AEROSOL, METERED RESPIRATORY (INHALATION) PRN
Status: DISCONTINUED | OUTPATIENT
Start: 2023-04-25 | End: 2023-04-26 | Stop reason: HOSPADM

## 2023-04-25 RX ORDER — ONDANSETRON 2 MG/ML
8 INJECTION INTRAMUSCULAR; INTRAVENOUS
Status: DISCONTINUED | OUTPATIENT
Start: 2023-04-25 | End: 2023-04-26 | Stop reason: HOSPADM

## 2023-04-25 RX ORDER — SODIUM CHLORIDE 9 MG/ML
INJECTION, SOLUTION INTRAVENOUS CONTINUOUS
Status: DISCONTINUED | OUTPATIENT
Start: 2023-04-25 | End: 2023-04-26 | Stop reason: HOSPADM

## 2023-04-25 RX ORDER — EPINEPHRINE 1 MG/ML
0.3 INJECTION, SOLUTION, CONCENTRATE INTRAVENOUS PRN
Status: DISCONTINUED | OUTPATIENT
Start: 2023-04-25 | End: 2023-04-26 | Stop reason: HOSPADM

## 2023-04-25 RX ORDER — SODIUM CHLORIDE 9 MG/ML
5-250 INJECTION, SOLUTION INTRAVENOUS PRN
Status: DISCONTINUED | OUTPATIENT
Start: 2023-04-25 | End: 2023-04-26 | Stop reason: HOSPADM

## 2023-04-25 RX ORDER — SODIUM CHLORIDE 9 MG/ML
INJECTION, SOLUTION INTRAVENOUS CONTINUOUS
OUTPATIENT
Start: 2023-04-29

## 2023-04-25 RX ORDER — EPINEPHRINE 1 MG/ML
0.3 INJECTION, SOLUTION, CONCENTRATE INTRAVENOUS PRN
OUTPATIENT
Start: 2023-04-29

## 2023-04-25 RX ORDER — HEPARIN SODIUM (PORCINE) LOCK FLUSH IV SOLN 100 UNIT/ML 100 UNIT/ML
500 SOLUTION INTRAVENOUS PRN
OUTPATIENT
Start: 2023-04-29

## 2023-04-25 RX ORDER — SODIUM CHLORIDE 0.9 % (FLUSH) 0.9 %
5-40 SYRINGE (ML) INJECTION PRN
OUTPATIENT
Start: 2023-04-29

## 2023-04-25 RX ADMIN — SODIUM CHLORIDE 25 ML/HR: 9 INJECTION, SOLUTION INTRAVENOUS at 16:45

## 2023-04-25 RX ADMIN — FERRIC CARBOXYMALTOSE INJECTION 750 MG: 50 INJECTION, SOLUTION INTRAVENOUS at 17:00

## 2023-04-25 NOTE — PROGRESS NOTES
Arrived to the [facility]. injectafer completed. Patient tolerated well. Any issues or concerns during appointment: no.  Patient aware of next lab and [facility] office visit on 7/12  Patient instructed to call provider with temperature of 100.4 or greater or nausea/vomiting/ diarrhea or pain not controlled by medications  Discharged to home ambulatory.

## 2023-05-15 ENCOUNTER — TELEPHONE (OUTPATIENT)
Dept: FAMILY MEDICINE CLINIC | Facility: CLINIC | Age: 43
End: 2023-05-15

## 2023-05-15 NOTE — TELEPHONE ENCOUNTER
Pt called into nurse triage regarding chest pain. They are not bad but it is not normal for her. Did say that her BP has been elevated even tho she is on medication. Has been having so fatigue and has been extra tire that she doesn't even want to get out of bed. Wants an appointment.

## 2023-05-16 ENCOUNTER — TELEMEDICINE (OUTPATIENT)
Dept: FAMILY MEDICINE CLINIC | Facility: CLINIC | Age: 43
End: 2023-05-16
Payer: COMMERCIAL

## 2023-05-16 DIAGNOSIS — E55.9 VITAMIN D DEFICIENCY: Primary | ICD-10-CM

## 2023-05-16 DIAGNOSIS — R53.82 CHRONIC FATIGUE: ICD-10-CM

## 2023-05-16 PROCEDURE — 99214 OFFICE O/P EST MOD 30 MIN: CPT | Performed by: FAMILY MEDICINE

## 2023-05-16 ASSESSMENT — ENCOUNTER SYMPTOMS
SHORTNESS OF BREATH: 0
COUGH: 0
VOMITING: 0
DIARRHEA: 0
CONSTIPATION: 0

## 2023-05-16 NOTE — PROGRESS NOTES
Heri Huang (:  1980) is a Established patient, here for evaluation of the following:    Assessment & Plan   Below is the assessment and plan developed based on review of pertinent history, physical exam, labs, studies, and medications. 1. Vitamin D deficiency  -     Vitamin D 25 Hydroxy; Future  2. Chronic fatigue  -     TSH; Future  -     Vitamin D 25 Hydroxy; Future  -     Vitamin B12; Future  Checking labs and vitals. Asked her to call and schedule this with front office. Once resulted will treat as needed. Could be post viral fatigue. Return for soon for labs, orders in. .       Subjective   HPI  Chief Complaint   Patient presents with    Fatigue   Not been feeling herself. Had her last iron infusion about 3 weeks ago. Feeling like it is sometimes even hard to just lift her arms up when laying in bed. Reached out to her hematologist and he asked her to f/u with her PCP. We did recently increase her bp meds and her most recent bp is around 130/90. Feels like she has a lot of brain fog and some memory concerns. When she is talking with her partner, she is told they already spoke about certain things. Denies weight loss aside from a variation of DASH diet. Occasionally gets a sharp fleeting pain occasionally. She is seeing GI to address epigastric issues and GERD. Review of Systems   Constitutional:  Positive for fatigue. Negative for diaphoresis and unexpected weight change. HENT:  Negative for congestion. Eyes:  Negative for visual disturbance. Respiratory:  Negative for cough and shortness of breath. Cardiovascular:  Positive for chest pain. Gastrointestinal:  Negative for constipation, diarrhea and vomiting. Genitourinary:  Negative for dysuria. Neurological:  Negative for headaches. Psychiatric/Behavioral:  Negative for dysphoric mood and sleep disturbance. The patient is not nervous/anxious.          Objective     Physical

## 2023-05-17 ENCOUNTER — NURSE ONLY (OUTPATIENT)
Dept: FAMILY MEDICINE CLINIC | Facility: CLINIC | Age: 43
End: 2023-05-17

## 2023-05-17 ENCOUNTER — TELEPHONE (OUTPATIENT)
Dept: FAMILY MEDICINE CLINIC | Facility: CLINIC | Age: 43
End: 2023-05-17

## 2023-05-17 VITALS — WEIGHT: 274 LBS | BODY MASS INDEX: 45.6 KG/M2 | DIASTOLIC BLOOD PRESSURE: 65 MMHG | SYSTOLIC BLOOD PRESSURE: 120 MMHG

## 2023-05-17 DIAGNOSIS — R53.82 CHRONIC FATIGUE: Primary | ICD-10-CM

## 2023-05-17 DIAGNOSIS — R53.82 CHRONIC FATIGUE: ICD-10-CM

## 2023-05-17 DIAGNOSIS — E55.9 VITAMIN D DEFICIENCY: ICD-10-CM

## 2023-05-17 LAB
25(OH)D3 SERPL-MCNC: 17.7 NG/ML (ref 30–100)
TSH, 3RD GENERATION: 1.78 UIU/ML (ref 0.36–3.74)
VIT B12 SERPL-MCNC: 362 PG/ML (ref 193–986)

## 2023-05-17 RX ORDER — ERGOCALCIFEROL 1.25 MG/1
50000 CAPSULE ORAL WEEKLY
Qty: 12 CAPSULE | Refills: 1 | Status: SHIPPED | OUTPATIENT
Start: 2023-05-17

## 2023-05-17 NOTE — TELEPHONE ENCOUNTER
Patient came in today to have her blood work done and get her blood pressure checked. It was 120/65 in the left arm with a thigh cuff.

## 2023-05-19 DIAGNOSIS — E55.9 VITAMIN D DEFICIENCY: Primary | ICD-10-CM

## 2023-05-19 DIAGNOSIS — R53.82 CHRONIC FATIGUE: ICD-10-CM

## 2023-06-26 ENCOUNTER — OFFICE VISIT (OUTPATIENT)
Dept: ORTHOPEDIC SURGERY | Age: 43
End: 2023-06-26
Payer: COMMERCIAL

## 2023-06-26 DIAGNOSIS — M22.2X2 PATELLOFEMORAL SYNDROME, BILATERAL: Primary | ICD-10-CM

## 2023-06-26 DIAGNOSIS — M22.2X1 PATELLOFEMORAL SYNDROME, BILATERAL: Primary | ICD-10-CM

## 2023-06-26 PROBLEM — K52.9 COLITIS: Status: ACTIVE | Noted: 2023-05-09

## 2023-06-26 PROBLEM — K62.5 RECTAL BLEED: Status: ACTIVE | Noted: 2020-07-01

## 2023-06-26 PROBLEM — H69.90 EUSTACHIAN TUBE DYSFUNCTION: Status: ACTIVE | Noted: 2023-01-06

## 2023-06-26 PROBLEM — I26.09 ACUTE PULMONARY EMBOLISM WITH ACUTE COR PULMONALE (HCC): Status: ACTIVE | Noted: 2022-12-18

## 2023-06-26 PROBLEM — K59.09 CHRONIC CONSTIPATION: Status: ACTIVE | Noted: 2020-07-01

## 2023-06-26 PROBLEM — H69.80 EUSTACHIAN TUBE DYSFUNCTION: Status: ACTIVE | Noted: 2023-01-06

## 2023-06-26 PROBLEM — J20.9 ACUTE BRONCHITIS: Status: ACTIVE | Noted: 2022-12-03

## 2023-06-26 PROBLEM — J96.02 ACUTE RESPIRATORY FAILURE WITH HYPOXIA AND HYPERCAPNIA (HCC): Status: ACTIVE | Noted: 2022-12-18

## 2023-06-26 PROBLEM — J96.01 ACUTE RESPIRATORY FAILURE WITH HYPOXIA AND HYPERCAPNIA (HCC): Status: ACTIVE | Noted: 2022-12-18

## 2023-06-26 PROBLEM — J02.9 VIRAL PHARYNGITIS: Status: ACTIVE | Noted: 2023-01-06

## 2023-06-26 PROBLEM — J35.1 SWOLLEN TONSIL: Status: ACTIVE | Noted: 2023-01-06

## 2023-06-26 PROBLEM — K21.9 GERD (GASTROESOPHAGEAL REFLUX DISEASE): Status: ACTIVE | Noted: 2020-07-01

## 2023-06-26 PROBLEM — R11.0 NAUSEA: Status: ACTIVE | Noted: 2023-05-09

## 2023-06-26 PROCEDURE — 99203 OFFICE O/P NEW LOW 30 MIN: CPT | Performed by: PHYSICIAN ASSISTANT

## 2023-06-26 PROCEDURE — 20610 DRAIN/INJ JOINT/BURSA W/O US: CPT | Performed by: PHYSICIAN ASSISTANT

## 2023-06-26 RX ORDER — METHYLPREDNISOLONE ACETATE 40 MG/ML
160 INJECTION, SUSPENSION INTRA-ARTICULAR; INTRALESIONAL; INTRAMUSCULAR; SOFT TISSUE ONCE
Status: COMPLETED | OUTPATIENT
Start: 2023-06-26 | End: 2023-06-26

## 2023-06-26 RX ADMIN — METHYLPREDNISOLONE ACETATE 160 MG: 40 INJECTION, SUSPENSION INTRA-ARTICULAR; INTRALESIONAL; INTRAMUSCULAR; SOFT TISSUE at 16:18

## 2023-07-05 ENCOUNTER — HOSPITAL ENCOUNTER (OUTPATIENT)
Dept: LAB | Age: 43
Discharge: HOME | End: 2023-07-08
Payer: COMMERCIAL

## 2023-07-05 DIAGNOSIS — Z86.711 HISTORY OF PULMONARY EMBOLISM: ICD-10-CM

## 2023-07-05 LAB
D DIMER PPP FEU-MCNC: <0.27 UG/ML(FEU)
DEPRECATED AT III PPP: 81 % (ref 87–125)

## 2023-07-05 PROCEDURE — 85732 THROMBOPLASTIN TIME PARTIAL: CPT

## 2023-07-05 PROCEDURE — 85240 CLOT FACTOR VIII AHG 1 STAGE: CPT

## 2023-07-05 PROCEDURE — 85303 CLOT INHIBIT PROT C ACTIVITY: CPT

## 2023-07-05 PROCEDURE — 81240 F2 GENE: CPT

## 2023-07-05 PROCEDURE — 36415 COLL VENOUS BLD VENIPUNCTURE: CPT

## 2023-07-05 PROCEDURE — 85307 ASSAY ACTIVATED PROTEIN C: CPT

## 2023-07-05 PROCEDURE — 85613 RUSSELL VIPER VENOM DILUTED: CPT

## 2023-07-05 PROCEDURE — 81241 F5 GENE: CPT

## 2023-07-05 PROCEDURE — 85300 ANTITHROMBIN III ACTIVITY: CPT

## 2023-07-05 PROCEDURE — 85301 ANTITHROMBIN III ANTIGEN: CPT

## 2023-07-05 PROCEDURE — 85379 FIBRIN DEGRADATION QUANT: CPT

## 2023-07-05 PROCEDURE — 85220 BLOOC CLOT FACTOR V TEST: CPT

## 2023-07-05 PROCEDURE — 85210 CLOT FACTOR II PROTHROM SPEC: CPT

## 2023-07-05 PROCEDURE — 85730 THROMBOPLASTIN TIME PARTIAL: CPT

## 2023-07-05 PROCEDURE — 85302 CLOT INHIBIT PROT C ANTIGEN: CPT

## 2023-07-05 PROCEDURE — 83520 IMMUNOASSAY QUANT NOS NONAB: CPT

## 2023-07-05 PROCEDURE — 83090 ASSAY OF HOMOCYSTEINE: CPT

## 2023-07-05 PROCEDURE — 81291 MTHFR GENE: CPT

## 2023-07-05 PROCEDURE — 85598 HEXAGNAL PHOSPH PLTLT NEUTRL: CPT

## 2023-07-05 PROCEDURE — 86148 ANTI-PHOSPHOLIPID ANTIBODY: CPT

## 2023-07-05 PROCEDURE — 86146 BETA-2 GLYCOPROTEIN ANTIBODY: CPT

## 2023-07-05 PROCEDURE — 85306 CLOT INHIBIT PROT S FREE: CPT

## 2023-07-05 PROCEDURE — 86147 CARDIOLIPIN ANTIBODY EA IG: CPT

## 2023-07-06 ENCOUNTER — HOSPITAL ENCOUNTER (OUTPATIENT)
Dept: PHYSICAL THERAPY | Age: 43
Setting detail: RECURRING SERIES
Discharge: HOME | End: 2023-07-09
Payer: COMMERCIAL

## 2023-07-06 LAB
APCR PPP: 2.9 RATIO (ref 2.2–3.5)
AT III AG PPP IA-ACNC: 96 % (ref 72–124)
AT III PPP CHRO-ACNC: 139 % (ref 75–135)
B2 GLYCOPROT1 IGG SER-ACNC: <9 GPI IGG UNITS (ref 0–20)
B2 GLYCOPROT1 IGM SER-ACNC: <9 GPI IGM UNITS (ref 0–32)
FACT V ACT/NOR PPP: 96 % (ref 70–150)
PROT C PPP-ACNC: 129 % (ref 73–180)
PROTHROM ACT/NOR PPP: 128 % (ref 50–154)

## 2023-07-06 PROCEDURE — 97110 THERAPEUTIC EXERCISES: CPT

## 2023-07-06 PROCEDURE — 97161 PT EVAL LOW COMPLEX 20 MIN: CPT

## 2023-07-06 ASSESSMENT — PAIN SCALES - GENERAL: PAINLEVEL_OUTOF10: 0

## 2023-07-07 LAB
HCYS SERPL-SCNC: 12.5 UMOL/L (ref 0–14.5)
PROT C AG PPP IA-ACNC: 123 % (ref 60–150)

## 2023-07-08 LAB
CARDIOLIPIN IGA SER IA-ACNC: <9 APL U/ML (ref 0–11)
CARDIOLIPIN IGG SER IA-ACNC: <9 GPL U/ML (ref 0–14)
CARDIOLIPIN IGM SER IA-ACNC: <9 MPL U/ML (ref 0–12)
P ETHANOLAMINE AB, IGA: NORMAL U/ML
P ETHANOLAMINE AB, IGG: NORMAL U/ML
P ETHANOLAMINE AB, IGM: NORMAL U/ML
P GLYCEROL IGA: NORMAL U/ML
P GLYCEROL IGG: NORMAL U/ML
P GLYCEROL IGM: NORMAL U/ML
PS IGA SER-ACNC: <1 APS UNITS (ref 0–19)
PS IGG SER-ACNC: <9 UNITS (ref 0–30)
PS IGM SER-ACNC: <10 UNITS (ref 0–30)

## 2023-07-10 LAB
F2 GENE MUT ANL BLD/T: NORMAL
F5 P.R506Q BLD/T QL: NORMAL
IMP & REVIEW OF LAB RESULTS: NORMAL
IMP & REVIEW OF LAB RESULTS: NORMAL

## 2023-07-11 ENCOUNTER — NURSE ONLY (OUTPATIENT)
Dept: FAMILY MEDICINE CLINIC | Facility: CLINIC | Age: 43
End: 2023-07-11

## 2023-07-11 DIAGNOSIS — E55.9 VITAMIN D DEFICIENCY: ICD-10-CM

## 2023-07-11 DIAGNOSIS — R53.82 CHRONIC FATIGUE: ICD-10-CM

## 2023-07-11 LAB
APCR PPP: 2.5 RATIO
APTT HEX PL PPP: 5 SEC
APTT IMM NP PPP: ABNORMAL SEC
APTT PPP 1:1 SALINE: ABNORMAL SEC
APTT PPP: 26.7 SEC
AT III ACT/NOR PPP CHRO: 135 %
B2 GLYCOPROT1 IGA SER-ACNC: <10 SAU
B2 GLYCOPROT1 IGG SER-ACNC: <10 SGU
B2 GLYCOPROT1 IGM SER-ACNC: <10 SMU
BASOPHILS # BLD: 0 K/UL (ref 0–0.2)
BASOPHILS NFR BLD: 0 % (ref 0–2)
CARDIOLIPIN IGG SER IA-ACNC: <10 GPL
CARDIOLIPIN IGM SER IA-ACNC: <10 MPL
CONFIRM DRVVT: 48.5 SEC
DIFFERENTIAL METHOD BLD: ABNORMAL
EOSINOPHIL # BLD: 0.1 K/UL (ref 0–0.8)
EOSINOPHIL NFR BLD: 1 % (ref 0.5–7.8)
ERYTHROCYTE [DISTWIDTH] IN BLOOD BY AUTOMATED COUNT: 14.2 % (ref 11.9–14.6)
F2 GENE MUT ANL BLD/T: ABNORMAL
FACTOR VIII ACTIVITY: 205 %
GENE DIS ANL INTERP-IMP: ABNORMAL
HCT VFR BLD AUTO: 42.1 % (ref 35.8–46.3)
HGB BLD-MCNC: 12.5 G/DL (ref 11.7–15.4)
HOMOCYSTEINE: 11.6 UMOL/L
IMM GRANULOCYTES # BLD AUTO: 0 K/UL (ref 0–0.5)
IMM GRANULOCYTES NFR BLD AUTO: 0 % (ref 0–5)
LABORATORY COMMENT REPORT: ABNORMAL
LABORATORY COMMENT REPORT: ABNORMAL
LYMPHOCYTES # BLD: 3.2 K/UL (ref 0.5–4.6)
LYMPHOCYTES NFR BLD: 36 % (ref 13–44)
MCH RBC QN AUTO: 27.4 PG (ref 26.1–32.9)
MCHC RBC AUTO-ENTMCNC: 29.7 G/DL (ref 31.4–35)
MCV RBC AUTO: 92.1 FL (ref 82–102)
MONOCYTES # BLD: 0.9 K/UL (ref 0.1–1.3)
MONOCYTES NFR BLD: 10 % (ref 4–12)
NEUTS SEG # BLD: 4.7 K/UL (ref 1.7–8.2)
NEUTS SEG NFR BLD: 53 % (ref 43–78)
NRBC # BLD: 0 K/UL (ref 0–0.2)
PLATELET # BLD AUTO: 272 K/UL (ref 150–450)
PMV BLD AUTO: 10.6 FL (ref 9.4–12.3)
PROT C ACT/NOR PPP CHRO: 126 %
PROTEIN S ANTIGEN, FREE: 78 %
RBC # BLD AUTO: 4.57 M/UL (ref 4.05–5.2)
REF LAB TEST METHOD: ABNORMAL
SCREEN DRVVT/NORMAL: 1.2 RATIO
SCREEN DRVVT: 62.9 SEC
WBC # BLD AUTO: 9 K/UL (ref 4.3–11.1)

## 2023-07-12 ENCOUNTER — TELEMEDICINE (OUTPATIENT)
Dept: ONCOLOGY | Age: 43
End: 2023-07-12
Payer: COMMERCIAL

## 2023-07-12 DIAGNOSIS — Z86.711 HISTORY OF PULMONARY EMBOLISM: Primary | ICD-10-CM

## 2023-07-12 DIAGNOSIS — D50.0 IRON DEFICIENCY ANEMIA SECONDARY TO BLOOD LOSS (CHRONIC): ICD-10-CM

## 2023-07-12 LAB
25(OH)D3 SERPL-MCNC: 29.1 NG/ML (ref 30–100)
FERRITIN SERPL-MCNC: 658 NG/ML (ref 8–388)
IMP & REVIEW OF LAB RESULTS: NORMAL
MTHFR GENE MUT ANL BLD/T: NORMAL

## 2023-07-12 PROCEDURE — 99214 OFFICE O/P EST MOD 30 MIN: CPT | Performed by: INTERNAL MEDICINE

## 2023-07-12 NOTE — PROGRESS NOTES
Ruchi Irby (:  1980) is a Established patient, here for evaluation of the following:     Chief Complaint:     SCOTT  PE      History of Present Illness:   Reason for Referral:  Iron deficiency anemia       Referring Provider:  Dr. Keisha Tuttle       Primary Care Provider:  Dr. Keisha Tuttle       Family History of Cancer/Hematologic disorders:  MGM, Breast Ca; Great MGM, Breast, Cervical, Ovarian Ca       Presenting Symptoms:  Syncope       Ms. Evelyn Garcia is a 37 y.o. female whose medical history includes SCOTT, menometrorrhagia, obesity, persistent insomnia, headache, Vit  D deficiency, chronic fatigue, L knee chronic pain, palpitations, anxiety, pre-diabetes, , hysteroscopy w/endometrial ablation. Ms. Sydney Garcia presented to [facility] ER via EMS on  for syncope. She reported having a D&C w/ablation (to control bleeding) the week prior to ER visit and had fatigue, SOB, and weakness since the procedure. She received 1 unit PRBC in the ER. Ms. Sydney Garcia presented to Dr. Karan Fonseca on  for follow ER up. She was prescribed hemoplex supplement and additional labs were drawn. She denied any overt bleeding, but did report heart racing and feeling SOB w/exertion, intermittent chest pain  and heart palpitations. Her labs on  showed H/H 8.5/31.1, MCV 78, MCH 21.4, MCHC 27.3, , , ABS LYMPH 3.8. She saw Dr. Mg Plunkett, cardiology on . Iron levels were drawn and EKG performed with changes consistent with atrial enlargement. Labs showed IRON 13, TIBC 388, TRANS SAT 3, FERRITIN 21. Ms. Sydney Garcia is referred to [facility] for further evaluation and consideration of iron infusion for Iron deficiency anemia.                 8/10/2021 09:25  2021 06:56  2021 21:55  2021 16:49           WBC  6.1     9.7  10.4     RBC  3.09 (L)     3.47 (L)  3.98     HGB  6.7 (LL)  7.0 (L)  7.3 (L)  8.5 (L)     HCT  23.2 (L)     25.5 (L)  31.1 (L)     MCV  75.1 (L)

## 2023-07-12 NOTE — PATIENT INSTRUCTIONS
Wesley Eric MD ZN:[id number]      P ethanolamine Ab, IgA 07/05/2023 PENDING  U/mL Incomplete    P ethanolamine Ab, IgG 07/05/2023 PENDING  U/mL Incomplete    P ethanolamine Ab, IgM 07/05/2023 PENDING  U/mL Incomplete    P glycerol IgA 07/05/2023 PENDING  U/mL Incomplete    P glycerol IgG 07/05/2023 PENDING  U/mL Incomplete    P glycerol IgM 07/05/2023 PENDING  U/mL Incomplete    Protein C Antigen 07/05/2023 123  60 - 150 % Final    Comment: (NOTE)  Performed At: [facility]  [id number] [place] [address] [id number]  Ana Maria Cervantes MD ZH:[id number]           Treatment Summary has been discussed and given to patient: n/a        -------------------------------------------------------------------------------------------------------------------  Please call our office at [phone] if you have any  of the following symptoms:   Fever of 100.5 or greater  Chills  Shortness of breath  Swelling or pain in one leg    After office hours an answering service is available and will contact a provider for emergencies or if you are experiencing any of the above symptoms. Patient does express an interest in My Chart. My Chart log in information explained on the after visit summary printout at the [address] desk.     Inessa Valdez RN

## 2023-07-13 ENCOUNTER — HOSPITAL ENCOUNTER (OUTPATIENT)
Dept: PHYSICAL THERAPY | Age: 43
Setting detail: RECURRING SERIES
End: 2023-07-13
Payer: COMMERCIAL

## 2023-07-13 LAB
CARDIOLIPIN IGA SER IA-ACNC: <9 APL U/ML (ref 0–11)
CARDIOLIPIN IGG SER IA-ACNC: <9 GPL U/ML (ref 0–14)
CARDIOLIPIN IGM SER IA-ACNC: <9 MPL U/ML (ref 0–12)
P ETHANOLAMINE AB, IGA: 3.9 U/ML
P ETHANOLAMINE AB, IGG: 0.9 U/ML
P ETHANOLAMINE AB, IGM: 1.6 U/ML
P GLYCEROL IGA: 1.6 U/ML
P GLYCEROL IGG: 1.8 U/ML
P GLYCEROL IGM: 1.4 U/ML
PS IGA SER-ACNC: <1 APS UNITS (ref 0–19)
PS IGG SER-ACNC: <9 UNITS (ref 0–30)
PS IGM SER-ACNC: <10 UNITS (ref 0–30)

## 2023-07-13 NOTE — PROGRESS NOTES
Wiliam Boo  : 1980  Primary: Ramon Rod  Secondary: SC COPAY PATIENT ASSISTANCE [facility]  [address]ne [address]  [address]  Phone: [phone]  Fax: [phone]    PT Visit Info: Total # of Visits Approved: 30  Total # of Visits to Date: 1  Canceled Appointment: 1     OT Visit Info:  No data recorded    OUTPATIENT THERAPY: 2023  Episode  Appt Desk        Wiliam Boo cancelled her appointment for today due to unknown reasons. Will plan to follow up next during next appointment. Thank you,  Ej Middleton, PT    Future Appointments   Date Time Provider [address]   2023  7:00 PM Ej Middleton, PT [facility]   2023  4:00 PM Ej Middleton, PT [facility]   2023  4:00 PM Ej Kane, PT [facility]   2023  3:15 PM Ej Kane, PT [facility]   2023  2:00 PM Rohini Inman MD [facility]   2023  4:00 PM Ej Middleton, PT [facility]   2023  4:00 PM Ej Kane, PT [facility]   2023  4:00 PM Ej Kane, PT [facility]   2023  4:00 PM Ej Kane, PT [facility]   2023  4:00 PM Ej Kane, PT [facility]   2023  4:00 PM Ej Kane, PT [facility]   2023  4:00 PM Ej Kane, PT [facility]   2023  4:00 PM Ej Kane, PT [facility]   2024  2:00 PM [address] [place] [address]   2024  2:30 PM Scarlett John MD [facility]      .

## 2023-07-18 ENCOUNTER — TELEPHONE (OUTPATIENT)
Dept: ONCOLOGY | Age: 43
End: 2023-07-18

## 2023-07-24 ENCOUNTER — HOSPITAL ENCOUNTER (OUTPATIENT)
Dept: PHYSICAL THERAPY | Age: 43
Setting detail: RECURRING SERIES
End: 2023-07-24
Payer: COMMERCIAL

## 2023-07-24 NOTE — THERAPY DISCHARGE
Squeeze  - 1 x daily - 3 x weekly - 2 sets - 10 reps - 5 hold  - Active Straight Leg Raise with Quad Set  - 1 x daily - 3 x weekly - 2 sets - 10 reps - 5 hold  - Bridge with Hip Abduction and Resistance  - 1 x daily - 3 x weekly - 2 sets - 10 reps - 5 hold  - Clam with Resistance  - 1 x daily - 3 x weekly - 2 sets - 10 reps - 5 hold  - Standing Hip Abduction with Counter Support  - 1 x daily - 3 x weekly - 3 sets - 10 reps - 5 hold  - Standing Hip Extension with Counter Support  - 1 x daily - 3 x weekly - 3 sets - 10 reps - 5 hold

## 2023-07-27 ENCOUNTER — APPOINTMENT (OUTPATIENT)
Dept: PHYSICAL THERAPY | Age: 43
End: 2023-07-27
Payer: COMMERCIAL

## 2023-08-07 ENCOUNTER — OFFICE VISIT (OUTPATIENT)
Dept: FAMILY MEDICINE CLINIC | Facility: CLINIC | Age: 43
End: 2023-08-07
Payer: COMMERCIAL

## 2023-08-07 VITALS
BODY MASS INDEX: 46.98 KG/M2 | RESPIRATION RATE: 19 BRPM | DIASTOLIC BLOOD PRESSURE: 78 MMHG | HEIGHT: 65 IN | HEART RATE: 65 BPM | WEIGHT: 282 LBS | TEMPERATURE: 96.9 F | OXYGEN SATURATION: 97 % | SYSTOLIC BLOOD PRESSURE: 120 MMHG

## 2023-08-07 DIAGNOSIS — I10 PRIMARY HYPERTENSION: ICD-10-CM

## 2023-08-07 DIAGNOSIS — F33.0 MILD EPISODE OF RECURRENT MAJOR DEPRESSIVE DISORDER (HCC): Primary | ICD-10-CM

## 2023-08-07 DIAGNOSIS — E55.9 VITAMIN D DEFICIENCY: ICD-10-CM

## 2023-08-07 PROCEDURE — 3074F SYST BP LT 130 MM HG: CPT | Performed by: FAMILY MEDICINE

## 2023-08-07 PROCEDURE — 99214 OFFICE O/P EST MOD 30 MIN: CPT | Performed by: FAMILY MEDICINE

## 2023-08-07 PROCEDURE — 3078F DIAST BP <80 MM HG: CPT | Performed by: FAMILY MEDICINE

## 2023-08-07 RX ORDER — LOSARTAN POTASSIUM 50 MG/1
50 TABLET ORAL DAILY
Qty: 90 TABLET | Refills: 1 | Status: SHIPPED | OUTPATIENT
Start: 2023-08-07

## 2023-08-07 RX ORDER — BUPROPION HYDROCHLORIDE 150 MG/1
150 TABLET ORAL EVERY MORNING
Qty: 30 TABLET | Refills: 3 | Status: SHIPPED | OUTPATIENT
Start: 2023-08-07

## 2023-08-07 RX ORDER — ERGOCALCIFEROL 1.25 MG/1
50000 CAPSULE ORAL WEEKLY
Qty: 12 CAPSULE | Refills: 1 | Status: SHIPPED | OUTPATIENT
Start: 2023-08-07

## 2023-08-07 ASSESSMENT — PATIENT HEALTH QUESTIONNAIRE - PHQ9
2. FEELING DOWN, DEPRESSED OR HOPELESS: NOT AT ALL
2. FEELING DOWN, DEPRESSED OR HOPELESS: 0
1. LITTLE INTEREST OR PLEASURE IN DOING THINGS: 1
SUM OF ALL RESPONSES TO PHQ9 QUESTIONS 1 & 2: 1
SUM OF ALL RESPONSES TO PHQ QUESTIONS 1-9: 1
SUM OF ALL RESPONSES TO PHQ QUESTIONS 1-9: 1
SUM OF ALL RESPONSES TO PHQ9 QUESTIONS 1 & 2: 1
1. LITTLE INTEREST OR PLEASURE IN DOING THINGS: SEVERAL DAYS
SUM OF ALL RESPONSES TO PHQ QUESTIONS 1-9: 1
SUM OF ALL RESPONSES TO PHQ QUESTIONS 1-9: 1

## 2023-08-07 ASSESSMENT — ENCOUNTER SYMPTOMS
VOMITING: 0
COUGH: 0
DIARRHEA: 0
CONSTIPATION: 0
SHORTNESS OF BREATH: 0

## 2023-08-07 NOTE — PROGRESS NOTES
Alisa Cramer (:  1980) is a 37 y.o. female,Established patient, here for evaluation of the following chief complaint(s):  6 Month Follow-Up (On HTN. Had some blood work prior to the visit. Needs to have her FMLA paper work done again.) and Other (Is set up for Colonoscopy in Oct. Has been over 10 years since the last Tdap shot.)         ASSESSMENT/PLAN:  1. Mild episode of recurrent major depressive disorder (HCC)  -     buPROPion (WELLBUTRIN XL) 150 MG extended release tablet; Take 1 tablet by mouth every morning, Disp-30 tablet, R-3Normal  2. Vitamin D deficiency  -     vitamin D (ERGOCALCIFEROL) 1.25 MG (51338 UT) CAPS capsule; Take 1 capsule by mouth once a week, Disp-12 capsule, R-1Normal  3. Primary hypertension  -     losartan (COZAAR) 50 MG tablet; Take 1 tablet by mouth daily, Disp-90 tablet, R-1Normal      No follow-ups on file. Subjective   SUBJECTIVE/OBJECTIVE:  HPI  Chief Complaint   Patient presents with    6 Month Follow-Up     On HTN. Had some blood work prior to the visit. Needs to have her FMLA paper work done again. Other     Is set up for Colonoscopy in Oct. Has been over 10 years since the last Tdap shot. F/U HTN - BP well controlled. Compliant w/ med(s). Denies s/s target organ damage. She feels fatigued all the time but has attributed this to her menorrhagia and h/o iron def anemia. Cbc is now normal and ferritin high. Has been on antidepressants in the past but decreased her libido. Since stopping those, she reports no return of libido. Also reports intense head pressure just before she was going to orgasm, so she wonders if this will happen again. No daily headaches, no headaches which wake her from sleep, no changes to vision, hearing, taste, or smell, no seizure activity or loc. Review of Systems   Constitutional:  Positive for fatigue. Negative for diaphoresis and unexpected weight change. HENT:  Negative for congestion.     Eyes:

## 2023-08-23 DIAGNOSIS — D50.0 IRON DEFICIENCY ANEMIA SECONDARY TO BLOOD LOSS (CHRONIC): Primary | ICD-10-CM

## 2023-08-30 ENCOUNTER — HOSPITAL ENCOUNTER (OUTPATIENT)
Dept: LAB | Age: 43
Discharge: HOME | End: 2023-09-02
Payer: COMMERCIAL

## 2023-08-30 ENCOUNTER — OFFICE VISIT (OUTPATIENT)
Dept: ONCOLOGY | Age: 43
End: 2023-08-30
Payer: COMMERCIAL

## 2023-08-30 VITALS
TEMPERATURE: 98.1 F | OXYGEN SATURATION: 99 % | WEIGHT: 282.1 LBS | DIASTOLIC BLOOD PRESSURE: 88 MMHG | HEIGHT: 65 IN | RESPIRATION RATE: 16 BRPM | BODY MASS INDEX: 47 KG/M2 | SYSTOLIC BLOOD PRESSURE: 132 MMHG | HEART RATE: 63 BPM

## 2023-08-30 DIAGNOSIS — Z86.711 HISTORY OF PULMONARY EMBOLISM: Primary | ICD-10-CM

## 2023-08-30 DIAGNOSIS — D50.0 IRON DEFICIENCY ANEMIA SECONDARY TO BLOOD LOSS (CHRONIC): ICD-10-CM

## 2023-08-30 DIAGNOSIS — Z86.711 HISTORY OF PULMONARY EMBOLISM: ICD-10-CM

## 2023-08-30 LAB
ALBUMIN SERPL-MCNC: 3.4 G/DL (ref 3.5–5)
ALBUMIN/GLOB SERPL: 0.9 (ref 0.4–1.6)
ALP SERPL-CCNC: 50 U/L (ref 50–136)
ALT SERPL-CCNC: 29 U/L (ref 12–65)
ANION GAP SERPL CALC-SCNC: 3 MMOL/L (ref 2–11)
AST SERPL-CCNC: 17 U/L (ref 15–37)
BASOPHILS # BLD: 0.1 K/UL (ref 0–0.2)
BASOPHILS NFR BLD: 1 % (ref 0–2)
BILIRUB SERPL-MCNC: 0.4 MG/DL (ref 0.2–1.1)
BUN SERPL-MCNC: 10 MG/DL (ref 6–23)
CALCIUM SERPL-MCNC: 8.7 MG/DL (ref 8.3–10.4)
CHLORIDE SERPL-SCNC: 108 MMOL/L (ref 101–110)
CO2 SERPL-SCNC: 28 MMOL/L (ref 21–32)
CREAT SERPL-MCNC: 0.9 MG/DL (ref 0.6–1)
DIFFERENTIAL METHOD BLD: ABNORMAL
EOSINOPHIL # BLD: 0.3 K/UL (ref 0–0.8)
EOSINOPHIL NFR BLD: 5 % (ref 0.5–7.8)
ERYTHROCYTE [DISTWIDTH] IN BLOOD BY AUTOMATED COUNT: 13.3 % (ref 11.9–14.6)
FERRITIN SERPL-MCNC: 660 NG/ML (ref 8–388)
GLOBULIN SER CALC-MCNC: 3.6 G/DL (ref 2.8–4.5)
GLUCOSE SERPL-MCNC: 106 MG/DL (ref 65–100)
HCT VFR BLD AUTO: 38.8 % (ref 35.8–46.3)
HGB BLD-MCNC: 12.1 G/DL (ref 11.7–15.4)
IMM GRANULOCYTES # BLD AUTO: 0 K/UL (ref 0–0.5)
IMM GRANULOCYTES NFR BLD AUTO: 0 % (ref 0–5)
IRON SATN MFR SERPL: 18 %
IRON SERPL-MCNC: 44 UG/DL (ref 35–150)
LYMPHOCYTES # BLD: 2.6 K/UL (ref 0.5–4.6)
LYMPHOCYTES NFR BLD: 43 % (ref 13–44)
MCH RBC QN AUTO: 27.6 PG (ref 26.1–32.9)
MCHC RBC AUTO-ENTMCNC: 31.2 G/DL (ref 31.4–35)
MCV RBC AUTO: 88.6 FL (ref 82–102)
MONOCYTES # BLD: 0.4 K/UL (ref 0.1–1.3)
MONOCYTES NFR BLD: 7 % (ref 4–12)
NEUTS SEG # BLD: 2.7 K/UL (ref 1.7–8.2)
NEUTS SEG NFR BLD: 44 % (ref 43–78)
NRBC # BLD: 0 K/UL (ref 0–0.2)
PLATELET # BLD AUTO: 303 K/UL (ref 150–450)
PMV BLD AUTO: 10.5 FL (ref 9.4–12.3)
POTASSIUM SERPL-SCNC: 3.5 MMOL/L (ref 3.5–5.1)
PROT SERPL-MCNC: 7 G/DL (ref 6.3–8.2)
RBC # BLD AUTO: 4.38 M/UL (ref 4.05–5.2)
SODIUM SERPL-SCNC: 139 MMOL/L (ref 133–143)
TIBC SERPL-MCNC: 241 UG/DL (ref 250–450)
WBC # BLD AUTO: 6 K/UL (ref 4.3–11.1)

## 2023-08-30 PROCEDURE — 83550 IRON BINDING TEST: CPT

## 2023-08-30 PROCEDURE — 82728 ASSAY OF FERRITIN: CPT

## 2023-08-30 PROCEDURE — 36415 COLL VENOUS BLD VENIPUNCTURE: CPT

## 2023-08-30 PROCEDURE — 99214 OFFICE O/P EST MOD 30 MIN: CPT | Performed by: INTERNAL MEDICINE

## 2023-08-30 PROCEDURE — 80053 COMPREHEN METABOLIC PANEL: CPT

## 2023-08-30 PROCEDURE — 83540 ASSAY OF IRON: CPT

## 2023-08-30 PROCEDURE — 85025 COMPLETE CBC W/AUTO DIFF WBC: CPT

## 2023-08-30 RX ORDER — HEPARIN SODIUM (PORCINE) LOCK FLUSH IV SOLN 100 UNIT/ML 100 UNIT/ML
500 SOLUTION INTRAVENOUS PRN
OUTPATIENT
Start: 2023-08-30

## 2023-08-30 RX ORDER — HYDROCORTISONE 25 MG/G
CREAM TOPICAL
COMMUNITY
Start: 2023-08-16

## 2023-08-30 ASSESSMENT — PATIENT HEALTH QUESTIONNAIRE - PHQ9: DEPRESSION UNABLE TO ASSESS: PT REFUSES

## 2023-08-30 NOTE — PROGRESS NOTES
dictated via voice recognition software. Text and phrases may be limited by the accuracy and autoconversion of the software. The chart has been reviewed, but errors may still be present. Luis Mckee M.D.   [address] [place], [place] [address]  Office : [phone]  Fax : [phone]

## 2023-08-30 NOTE — PATIENT INSTRUCTIONS
Patient Instructions from Today's Visit    Reason for Visit:  Follow up    Plan:    -Continue Eliquis to 2.5mg twice daily.  -Will give you one more iron infusion.     Follow Up:  6 months    Recent Lab Results:  Hospital Outpatient Visit on 08/30/2023   Component Date Value Ref Range Status    WBC 08/30/2023 6.0  4.3 - 11.1 K/uL Final    RBC 08/30/2023 4.38  4.05 - 5.2 M/uL Final    Hemoglobin 08/30/2023 12.1  11.7 - 15.4 g/dL Final    Hematocrit 08/30/2023 38.8  35.8 - 46.3 % Final    MCV 08/30/2023 88.6  82.0 - 102.0 FL Final    MCH 08/30/2023 27.6  26.1 - 32.9 PG Final    MCHC 08/30/2023 31.2 (L)  31.4 - 35.0 g/dL Final    RDW 08/30/2023 13.3  11.9 - 14.6 % Final    Platelets 12/34/2554 303  150 - 450 K/uL Final    MPV 08/30/2023 10.5  9.4 - 12.3 FL Final    nRBC 08/30/2023 0.00  0.0 - 0.2 K/uL Final    **Note: Absolute NRBC parameter is now reported with Hemogram**    Neutrophils % 08/30/2023 44  43 - 78 % Final    Lymphocytes % 08/30/2023 43  13 - 44 % Final    Monocytes % 08/30/2023 7  4.0 - 12.0 % Final    Eosinophils % 08/30/2023 5  0.5 - 7.8 % Final    Basophils % 08/30/2023 1  0.0 - 2.0 % Final    Immature Granulocytes 08/30/2023 0  0.0 - 5.0 % Final    Neutrophils Absolute 08/30/2023 2.7  1.7 - 8.2 K/UL Final    Lymphocytes Absolute 08/30/2023 2.6  0.5 - 4.6 K/UL Final    Monocytes Absolute 08/30/2023 0.4  0.1 - 1.3 K/UL Final    Eosinophils Absolute 08/30/2023 0.3  0.0 - 0.8 K/UL Final    Basophils Absolute 08/30/2023 0.1  0.0 - 0.2 K/UL Final    Absolute Immature Granulocyte 08/30/2023 0.0  0.0 - 0.5 K/UL Final    Differential Type 08/30/2023 AUTOMATED    Final    Sodium 08/30/2023 139  133 - 143 mmol/L Final    Potassium 08/30/2023 3.5  3.5 - 5.1 mmol/L Final    Chloride 08/30/2023 108  101 - 110 mmol/L Final    CO2 08/30/2023 28  21 - 32 mmol/L Final    Anion Gap 08/30/2023 3  2 - 11 mmol/L Final    Glucose 08/30/2023 106 (H)  65 - 100 mg/dL Final    BUN 08/30/2023 10  6 - 23 MG/DL Final

## 2023-09-08 ENCOUNTER — TELEPHONE (OUTPATIENT)
Dept: ONCOLOGY | Age: 43
End: 2023-09-08

## 2023-09-21 ENCOUNTER — TELEMEDICINE (OUTPATIENT)
Dept: FAMILY MEDICINE CLINIC | Facility: CLINIC | Age: 43
End: 2023-09-21
Payer: COMMERCIAL

## 2023-09-21 DIAGNOSIS — F51.01 PRIMARY INSOMNIA: Primary | ICD-10-CM

## 2023-09-21 DIAGNOSIS — F33.0 MILD EPISODE OF RECURRENT MAJOR DEPRESSIVE DISORDER (HCC): ICD-10-CM

## 2023-09-21 PROCEDURE — 99214 OFFICE O/P EST MOD 30 MIN: CPT | Performed by: FAMILY MEDICINE

## 2023-09-21 RX ORDER — DOXEPIN HYDROCHLORIDE 10 MG/1
10 CAPSULE ORAL NIGHTLY
Qty: 30 CAPSULE | Refills: 3 | Status: SHIPPED | OUTPATIENT
Start: 2023-09-21

## 2023-09-21 ASSESSMENT — ENCOUNTER SYMPTOMS
DIARRHEA: 0
SHORTNESS OF BREATH: 0
CONSTIPATION: 0

## 2023-09-21 NOTE — PROGRESS NOTES
[x]  Normal    [] Abnormal -          Discharge [x]  None visible   [] Abnormal -     HENT: [x] Normocephalic, atraumatic  [] Abnormal -   [x] Mouth/Throat: Mucous membranes are moist    External Ears [x] Normal  [] Abnormal -    Neck: [x] No visualized mass [] Abnormal -     Pulmonary/Chest: [x] Respiratory effort normal   [x] No visualized signs of difficulty breathing or respiratory distress        [] Abnormal -      Musculoskeletal:   [x] Normal gait with no signs of ataxia         [x] Normal range of motion of neck        [] Abnormal -     Neurological:        [x] No Facial Asymmetry (Cranial nerve 7 motor function) (limited exam due to video visit)          [x] No gaze palsy        [] Abnormal -          Skin:        [x] No significant exanthematous lesions or discoloration noted on facial skin         [] Abnormal -            Psychiatric:       [x] Normal Affect [] Abnormal -        [x] No Hallucinations    Other pertinent observable physical exam findings:-        9/21/2023     3:33 PM   Patient-Reported Vitals   Patient-Reported Weight 282   Patient-Reported Systolic 055 mmHg   Patient-Reported Diastolic 88 mmHg      Patient-Reported Vitals  No data recorded           Maria Del Rosario Quiroga, was evaluated through a synchronous (real-time) audio-video encounter. The patient (or guardian if applicable) is aware that this is a billable service, which includes applicable co-pays. This Virtual Visit was conducted with patient's (and/or legal guardian's) consent. Patient identification was verified, and a caregiver was present when appropriate.    The patient was located at Home: [address] [place], [place] [address]  Provider was located at Home ([facility]): Carmen Colbert MD

## 2023-09-25 ENCOUNTER — OFFICE VISIT (OUTPATIENT)
Dept: ORTHOPEDIC SURGERY | Age: 43
End: 2023-09-25
Payer: COMMERCIAL

## 2023-09-25 VITALS — WEIGHT: 282 LBS | BODY MASS INDEX: 46.98 KG/M2 | HEIGHT: 65 IN

## 2023-09-25 DIAGNOSIS — M22.2X1 PATELLOFEMORAL SYNDROME, BILATERAL: Primary | ICD-10-CM

## 2023-09-25 DIAGNOSIS — M25.562 PAIN IN BOTH KNEES, UNSPECIFIED CHRONICITY: ICD-10-CM

## 2023-09-25 DIAGNOSIS — M25.561 PAIN IN BOTH KNEES, UNSPECIFIED CHRONICITY: ICD-10-CM

## 2023-09-25 DIAGNOSIS — M22.2X2 PATELLOFEMORAL SYNDROME, BILATERAL: Primary | ICD-10-CM

## 2023-09-25 PROCEDURE — 99214 OFFICE O/P EST MOD 30 MIN: CPT | Performed by: PHYSICIAN ASSISTANT

## 2023-09-26 NOTE — PROGRESS NOTES
follow-up or get repeat injections every 3 months as needed. Procedure note:  After discussion of risks and benefits including, but not limited to pain, infection, steroid flare, fat necrosis, skin discoloration, increased blood sugar, and injury to blood vessels or nerves, the patient verbally consented to proceed with injection of the affected knee. We have discussed and they understand that decision to proceed with injection as part of the overall decision to avoid proceeding with major elective surgery. The Right knee(s) was sterilely prepped in standard fashion and injected with 2 cc of  depo medrol(40mg/ml), 2 cc of Naropin (0.5%). The patient tolerated the injection(s) well. The dressing(s) was(were) applied. Procedure note:  After discussion of risks and benefits including, but not limited to pain, infection, steroid flare, fat necrosis, skin discoloration, increased blood sugar, and injury to blood vessels or nerves, the patient verbally consented to proceed with injection of the affected knee. We have discussed and they understand that decision to proceed with injection as part of the overall decision to avoid proceeding with major elective surgery. The Left knee(s) was(were) sterilely prepped in standard fashion and injected with 2 cc of depo medrol(40mg/ml), 2 cc of Naropin (0.5%). The patient tolerated the injection(s) well. The dressing(s) was(were) applied. This was not a planned injection and involved medical decision making for decision to proceed with procedure. Return if symptoms worsen or fail to improve.         [place], [place]  09/26/23

## 2023-11-27 ENCOUNTER — TELEPHONE (OUTPATIENT)
Dept: FAMILY MEDICINE CLINIC | Facility: CLINIC | Age: 43
End: 2023-11-27

## 2023-11-27 DIAGNOSIS — I10 PRIMARY HYPERTENSION: Primary | ICD-10-CM

## 2023-11-27 NOTE — TELEPHONE ENCOUNTER
Losartan doesn't really have cough as a side effect. I wonder if its something else? How long has she had the cough?

## 2023-11-28 RX ORDER — CANDESARTAN 8 MG/1
8 TABLET ORAL DAILY
Qty: 90 TABLET | Refills: 0 | Status: SHIPPED | OUTPATIENT
Start: 2023-11-28

## 2023-11-28 NOTE — TELEPHONE ENCOUNTER
I called the patient, but got her voice mail box. I left a message letting her know what Dr. Severo Peers stated. I asked her to please call back to set up a lab/nurse visit in 2 weeks.

## 2023-11-28 NOTE — TELEPHONE ENCOUNTER
Patient called back about this. She stated that it was the Losartan medication and she stopped it on Saturday to see what would happen. She stated since stopping it the cough has stopped. She added in the she has been swelling and feeling bloated for some time even when she was taking the medication. She wants to see about getting a new medication sent in as soon as possible, because she added in that she has been getting headache and she feels like it may be from her blood pressure being high.

## 2024-01-03 ENCOUNTER — TELEPHONE (OUTPATIENT)
Dept: ONCOLOGY | Age: 44
End: 2024-01-03

## 2024-01-11 ENCOUNTER — TELEPHONE (OUTPATIENT)
Dept: ONCOLOGY | Age: 44
End: 2024-01-11

## 2024-01-11 NOTE — TELEPHONE ENCOUNTER
Physician provider: Tammy Mixon MD  Reason for today's call: Medical Clearance  Last office visit:08/30/23    Patient notified that their information will be routed to the [facility] clinical triage team for review. Patient is advised that they will receive a phone call from the triage department. If symptoms worsen before receiving a call back, the patient has been advised to proceed to the nearest ED.    Received call from Maryjane with Gastro & they need Cardiac Clearance 2 days prior for  Pt upcoming Appt with their office on 02/23/24.    F- [phone] [phone]

## 2024-01-11 NOTE — TELEPHONE ENCOUNTER
Faxed \"Anticoagulation Hold Clearance for [facility] Patients\" to fax number provided by Maryjane @ .  Confirmation received.

## 2024-01-15 ENCOUNTER — OFFICE VISIT (OUTPATIENT)
Dept: ONCOLOGY | Age: 44
End: 2024-01-15
Payer: COMMERCIAL

## 2024-01-15 ENCOUNTER — HOSPITAL ENCOUNTER (OUTPATIENT)
Dept: LAB | Age: 44
Discharge: HOME | End: 2024-01-18
Payer: COMMERCIAL

## 2024-01-15 VITALS
OXYGEN SATURATION: 97 % | WEIGHT: 274.5 LBS | DIASTOLIC BLOOD PRESSURE: 77 MMHG | HEART RATE: 64 BPM | BODY MASS INDEX: 45.73 KG/M2 | TEMPERATURE: 98 F | RESPIRATION RATE: 16 BRPM | HEIGHT: 65 IN | SYSTOLIC BLOOD PRESSURE: 130 MMHG

## 2024-01-15 DIAGNOSIS — D50.0 IRON DEFICIENCY ANEMIA SECONDARY TO BLOOD LOSS (CHRONIC): ICD-10-CM

## 2024-01-15 DIAGNOSIS — Z86.711 HISTORY OF PULMONARY EMBOLISM: ICD-10-CM

## 2024-01-15 DIAGNOSIS — Z86.711 HISTORY OF PULMONARY EMBOLISM: Primary | ICD-10-CM

## 2024-01-15 LAB
ALBUMIN SERPL-MCNC: 3.2 G/DL (ref 3.5–5)
ALBUMIN/GLOB SERPL: 0.8 (ref 0.4–1.6)
ALP SERPL-CCNC: 50 U/L (ref 50–136)
ALT SERPL-CCNC: 22 U/L (ref 12–65)
ANION GAP SERPL CALC-SCNC: 5 MMOL/L (ref 2–11)
AST SERPL-CCNC: 18 U/L (ref 15–37)
BASOPHILS # BLD: 0 K/UL (ref 0–0.2)
BASOPHILS NFR BLD: 1 % (ref 0–2)
BILIRUB SERPL-MCNC: 0.3 MG/DL (ref 0.2–1.1)
BUN SERPL-MCNC: 11 MG/DL (ref 6–23)
CALCIUM SERPL-MCNC: 8.8 MG/DL (ref 8.3–10.4)
CHLORIDE SERPL-SCNC: 110 MMOL/L (ref 103–113)
CO2 SERPL-SCNC: 28 MMOL/L (ref 21–32)
CREAT SERPL-MCNC: 0.9 MG/DL (ref 0.6–1)
DIFFERENTIAL METHOD BLD: ABNORMAL
EOSINOPHIL # BLD: 0.2 K/UL (ref 0–0.8)
EOSINOPHIL NFR BLD: 3 % (ref 0.5–7.8)
ERYTHROCYTE [DISTWIDTH] IN BLOOD BY AUTOMATED COUNT: 13.2 % (ref 11.9–14.6)
FERRITIN SERPL-MCNC: 712 NG/ML (ref 8–388)
GLOBULIN SER CALC-MCNC: 3.9 G/DL (ref 2.8–4.5)
GLUCOSE SERPL-MCNC: 102 MG/DL (ref 65–100)
HCT VFR BLD AUTO: 38.5 % (ref 35.8–46.3)
HGB BLD-MCNC: 12 G/DL (ref 11.7–15.4)
IMM GRANULOCYTES # BLD AUTO: 0 K/UL (ref 0–0.5)
IMM GRANULOCYTES NFR BLD AUTO: 0 % (ref 0–5)
IRON SATN MFR SERPL: 25 %
IRON SERPL-MCNC: 57 UG/DL (ref 35–150)
LYMPHOCYTES # BLD: 2.6 K/UL (ref 0.5–4.6)
LYMPHOCYTES NFR BLD: 36 % (ref 13–44)
MCH RBC QN AUTO: 27.5 PG (ref 26.1–32.9)
MCHC RBC AUTO-ENTMCNC: 31.2 G/DL (ref 31.4–35)
MCV RBC AUTO: 88.1 FL (ref 82–102)
MONOCYTES # BLD: 0.6 K/UL (ref 0.1–1.3)
MONOCYTES NFR BLD: 8 % (ref 4–12)
NEUTS SEG # BLD: 3.7 K/UL (ref 1.7–8.2)
NEUTS SEG NFR BLD: 52 % (ref 43–78)
NRBC # BLD: 0 K/UL (ref 0–0.2)
PLATELET # BLD AUTO: 306 K/UL (ref 150–450)
PMV BLD AUTO: 10.1 FL (ref 9.4–12.3)
POTASSIUM SERPL-SCNC: 4 MMOL/L (ref 3.5–5.1)
PROT SERPL-MCNC: 7.1 G/DL (ref 6.3–8.2)
RBC # BLD AUTO: 4.37 M/UL (ref 4.05–5.2)
SODIUM SERPL-SCNC: 143 MMOL/L (ref 136–146)
TIBC SERPL-MCNC: 231 UG/DL (ref 250–450)
WBC # BLD AUTO: 7.2 K/UL (ref 4.3–11.1)

## 2024-01-15 PROCEDURE — 83550 IRON BINDING TEST: CPT

## 2024-01-15 PROCEDURE — 99214 OFFICE O/P EST MOD 30 MIN: CPT | Performed by: INTERNAL MEDICINE

## 2024-01-15 PROCEDURE — 36415 COLL VENOUS BLD VENIPUNCTURE: CPT

## 2024-01-15 PROCEDURE — 80053 COMPREHEN METABOLIC PANEL: CPT

## 2024-01-15 PROCEDURE — 85025 COMPLETE CBC W/AUTO DIFF WBC: CPT

## 2024-01-15 PROCEDURE — 82728 ASSAY OF FERRITIN: CPT

## 2024-01-15 PROCEDURE — 83540 ASSAY OF IRON: CPT

## 2024-01-15 ASSESSMENT — PATIENT HEALTH QUESTIONNAIRE - PHQ9
4. FEELING TIRED OR HAVING LITTLE ENERGY: 0
5. POOR APPETITE OR OVEREATING: 0
SUM OF ALL RESPONSES TO PHQ QUESTIONS 1-9: 0
1. LITTLE INTEREST OR PLEASURE IN DOING THINGS: 0
SUM OF ALL RESPONSES TO PHQ QUESTIONS 1-9: 0
8. MOVING OR SPEAKING SO SLOWLY THAT OTHER PEOPLE COULD HAVE NOTICED. OR THE OPPOSITE, BEING SO FIGETY OR RESTLESS THAT YOU HAVE BEEN MOVING AROUND A LOT MORE THAN USUAL: 0
9. THOUGHTS THAT YOU WOULD BE BETTER OFF DEAD, OR OF HURTING YOURSELF: 0
SUM OF ALL RESPONSES TO PHQ QUESTIONS 1-9: 0
7. TROUBLE CONCENTRATING ON THINGS, SUCH AS READING THE NEWSPAPER OR WATCHING TELEVISION: 0
3. TROUBLE FALLING OR STAYING ASLEEP: 0
6. FEELING BAD ABOUT YOURSELF - OR THAT YOU ARE A FAILURE OR HAVE LET YOURSELF OR YOUR FAMILY DOWN: 0
2. FEELING DOWN, DEPRESSED OR HOPELESS: 0
SUM OF ALL RESPONSES TO PHQ QUESTIONS 1-9: 0
SUM OF ALL RESPONSES TO PHQ9 QUESTIONS 1 & 2: 0

## 2024-01-15 ASSESSMENT — ANXIETY QUESTIONNAIRES
5. BEING SO RESTLESS THAT IT IS HARD TO SIT STILL: 0
4. TROUBLE RELAXING: 0
3. WORRYING TOO MUCH ABOUT DIFFERENT THINGS: 0
6. BECOMING EASILY ANNOYED OR IRRITABLE: 0
7. FEELING AFRAID AS IF SOMETHING AWFUL MIGHT HAPPEN: 0
2. NOT BEING ABLE TO STOP OR CONTROL WORRYING: 0
1. FEELING NERVOUS, ANXIOUS, OR ON EDGE: 0
IF YOU CHECKED OFF ANY PROBLEMS ON THIS QUESTIONNAIRE, HOW DIFFICULT HAVE THESE PROBLEMS MADE IT FOR YOU TO DO YOUR WORK, TAKE CARE OF THINGS AT HOME, OR GET ALONG WITH OTHER PEOPLE: NOT DIFFICULT AT ALL
GAD7 TOTAL SCORE: 0

## 2024-01-15 NOTE — PATIENT INSTRUCTIONS
Patient Instructions from Today's Visit    Reason for Visit:  Follow up    Plan:  -Continue Eliquis to 2.5mg twice daily. You have completed Eliquis for 6 months, you need to complete 6 more months and then you are good to stop.   -Your iron levels are good today. There is no need for iron infusions for now.  -Follow up with PCP and come back as needed.     Follow Up:  As needed    Recent Lab Results:  Hospital Outpatient Visit on 01/15/2024   Component Date Value Ref Range Status    WBC 01/15/2024 7.2  4.3 - 11.1 K/uL Final    RBC 01/15/2024 4.37  4.05 - 5.2 M/uL Final    Hemoglobin 01/15/2024 12.0  11.7 - 15.4 g/dL Final    Hematocrit 01/15/2024 38.5  35.8 - 46.3 % Final    MCV 01/15/2024 88.1  82.0 - 102.0 FL Final    MCH 01/15/2024 27.5  26.1 - 32.9 PG Final    MCHC 01/15/2024 31.2 (L)  31.4 - 35.0 g/dL Final    RDW 01/15/2024 13.2  11.9 - 14.6 % Final    Platelets 01/15/2024 306  150 - 450 K/uL Final    MPV 01/15/2024 10.1  9.4 - 12.3 FL Final    nRBC 01/15/2024 0.00  0.0 - 0.2 K/uL Final    **Note: Absolute NRBC parameter is now reported with Hemogram**    Neutrophils % 01/15/2024 52  43 - 78 % Final    Lymphocytes % 01/15/2024 36  13 - 44 % Final    Monocytes % 01/15/2024 8  4.0 - 12.0 % Final    Eosinophils % 01/15/2024 3  0.5 - 7.8 % Final    Basophils % 01/15/2024 1  0.0 - 2.0 % Final    Immature Granulocytes 01/15/2024 0  0.0 - 5.0 % Final    Neutrophils Absolute 01/15/2024 3.7  1.7 - 8.2 K/UL Final    Lymphocytes Absolute 01/15/2024 2.6  0.5 - 4.6 K/UL Final    Monocytes Absolute 01/15/2024 0.6  0.1 - 1.3 K/UL Final    Eosinophils Absolute 01/15/2024 0.2  0.0 - 0.8 K/UL Final    Basophils Absolute 01/15/2024 0.0  0.0 - 0.2 K/UL Final    Absolute Immature Granulocyte 01/15/2024 0.0  0.0 - 0.5 K/UL Final    Differential Type 01/15/2024 AUTOMATED    Final    Sodium 01/15/2024 143  136 - 146 mmol/L Final    Potassium 01/15/2024 4.0  3.5 - 5.1 mmol/L Final    Chloride 01/15/2024 110  103 - 113 mmol/L Final

## 2024-01-15 NOTE — PROGRESS NOTES
[facility]: Office Visit Progress Note      Chief Complaint:     SCOTT  PE      History of Present Illness:   Reason for Referral:  Iron deficiency anemia       Referring Provider:  Dr. TED Skelton       Primary Care Provider:  Dr. TED Skelton       Family History of Cancer/Hematologic disorders:  MGM, Breast Ca; Great MGM, Breast, Cervical, Ovarian Ca       Presenting Symptoms:  Syncope       Ms. Tongia E. Abercrombie is a 43 y.o. female whose medical history includes SCOTT, menometrorrhagia, obesity, persistent insomnia, headache, Vit  D deficiency, chronic fatigue, L knee chronic pain, palpitations, anxiety, pre-diabetes, , hysteroscopy w/endometrial ablation.   Ms. Abercrombie presented to  ER via EMS on  for syncope. She reported having a D&C w/ablation (to control bleeding) the week prior to ER visit and had fatigue, SOB, and weakness since the procedure. She received 1 unit PRBC in the ER.   Ms. Abercrombie presented to Dr. Juan Skelton on  for follow ER up. She was prescribed hemoplex supplement and additional labs were drawn. She denied any overt bleeding, but did report heart racing and feeling SOB w/exertion, intermittent chest pain  and heart palpitations. Her labs on  showed H/H 8.5/31.1, MCV 78, MCH 21.4, MCHC 27.3, , , ABS LYMPH 3.8. She saw Dr. Jalloh, cardiology on . Iron levels were drawn and EKG performed with changes consistent with atrial enlargement.  Labs showed IRON 13, TIBC 388, TRANS SAT 3, FERRITIN 21.   Ms. Abercrombie is referred to [facility] for further evaluation and consideration of iron infusion for Iron deficiency anemia.                8/10/2021 09:25  2021 06:56  2021 21:55  2021 16:49           WBC  6.1     9.7  10.4     RBC  3.09 (L)     3.47 (L)  3.98     HGB  6.7 (LL)  7.0 (L)  7.3 (L)  8.5 (L)     HCT  23.2 (L)     25.5 (L)  31.1 (L)     MCV  75.1 (L)     73.5 (L)  78 (L)     MCH  21.7 (L)      18

## 2024-02-07 ENCOUNTER — OFFICE VISIT (OUTPATIENT)
Dept: FAMILY MEDICINE CLINIC | Facility: CLINIC | Age: 44
End: 2024-02-07
Payer: COMMERCIAL

## 2024-02-07 VITALS
HEART RATE: 72 BPM | OXYGEN SATURATION: 99 % | BODY MASS INDEX: 45.98 KG/M2 | TEMPERATURE: 98.4 F | RESPIRATION RATE: 19 BRPM | DIASTOLIC BLOOD PRESSURE: 92 MMHG | HEIGHT: 65 IN | SYSTOLIC BLOOD PRESSURE: 153 MMHG | WEIGHT: 276 LBS

## 2024-02-07 DIAGNOSIS — R41.3 MEMORY PROBLEM: ICD-10-CM

## 2024-02-07 DIAGNOSIS — N62 GYNECOMASTIA: ICD-10-CM

## 2024-02-07 DIAGNOSIS — I10 PRIMARY HYPERTENSION: ICD-10-CM

## 2024-02-07 DIAGNOSIS — R20.0 HAND NUMBNESS: ICD-10-CM

## 2024-02-07 DIAGNOSIS — F33.0 MILD EPISODE OF RECURRENT MAJOR DEPRESSIVE DISORDER (HCC): ICD-10-CM

## 2024-02-07 DIAGNOSIS — E55.9 VITAMIN D DEFICIENCY: Primary | ICD-10-CM

## 2024-02-07 DIAGNOSIS — E55.9 VITAMIN D DEFICIENCY: ICD-10-CM

## 2024-02-07 LAB
25(OH)D3 SERPL-MCNC: 38.9 NG/ML (ref 30–100)
ALBUMIN SERPL-MCNC: 3.6 G/DL (ref 3.5–5)
ALBUMIN/GLOB SERPL: 0.9 (ref 0.4–1.6)
ALP SERPL-CCNC: 52 U/L (ref 50–136)
ALT SERPL-CCNC: 20 U/L (ref 12–65)
ANION GAP SERPL CALC-SCNC: 3 MMOL/L (ref 2–11)
AST SERPL-CCNC: 18 U/L (ref 15–37)
BILIRUB SERPL-MCNC: 0.3 MG/DL (ref 0.2–1.1)
BUN SERPL-MCNC: 10 MG/DL (ref 6–23)
CALCIUM SERPL-MCNC: 9.1 MG/DL (ref 8.3–10.4)
CHLORIDE SERPL-SCNC: 108 MMOL/L (ref 103–113)
CO2 SERPL-SCNC: 29 MMOL/L (ref 21–32)
CREAT SERPL-MCNC: 0.9 MG/DL (ref 0.6–1)
GLOBULIN SER CALC-MCNC: 4.2 G/DL (ref 2.8–4.5)
GLUCOSE SERPL-MCNC: 104 MG/DL (ref 65–100)
POTASSIUM SERPL-SCNC: 3.7 MMOL/L (ref 3.5–5.1)
PROT SERPL-MCNC: 7.8 G/DL (ref 6.3–8.2)
SODIUM SERPL-SCNC: 140 MMOL/L (ref 136–146)
TSH, 3RD GENERATION: 1.79 UIU/ML (ref 0.36–3.74)

## 2024-02-07 PROCEDURE — 99214 OFFICE O/P EST MOD 30 MIN: CPT | Performed by: FAMILY MEDICINE

## 2024-02-07 PROCEDURE — 3077F SYST BP >= 140 MM HG: CPT | Performed by: FAMILY MEDICINE

## 2024-02-07 PROCEDURE — 3080F DIAST BP >= 90 MM HG: CPT | Performed by: FAMILY MEDICINE

## 2024-02-07 RX ORDER — CANDESARTAN 16 MG/1
16 TABLET ORAL DAILY
Qty: 90 TABLET | Refills: 1 | Status: SHIPPED | OUTPATIENT
Start: 2024-02-07

## 2024-02-07 RX ORDER — BUPROPION HYDROCHLORIDE 150 MG/1
150 TABLET ORAL EVERY MORNING
Qty: 90 TABLET | Refills: 1 | Status: SHIPPED | OUTPATIENT
Start: 2024-02-07

## 2024-02-07 ASSESSMENT — ENCOUNTER SYMPTOMS
DIARRHEA: 0
COUGH: 0
CONSTIPATION: 0
SHORTNESS OF BREATH: 0
VOMITING: 0

## 2024-02-07 NOTE — PROGRESS NOTES
Tongia Elaine Abercrombie (:  1980) is a 44 y.o. female,Established patient, here for evaluation of the following chief complaint(s):  6 Month Follow-Up (On Depression and HTN. States that the blood pressure has been running high at home.), Memory Loss (Has been forgetting thing and states she feels foggy headed.), Ear Problem (States that the left ear has been itching a lot ane wants it checked out.), and Other (Has appointment for Colonoscopy on 24. Has been over 10 years since the last Tdap and Pneumonia vac.)         ASSESSMENT/PLAN:  1. Primary hypertension  The following orders have not been finalized:  -     candesartan (ATACAND) 16 MG tablet  2. Mild episode of recurrent major depressive disorder (HCC)  The following orders have not been finalized:  -     buPROPion (WELLBUTRIN XL) 150 MG extended release tablet  Increase candesartan from 8 to 16mg.   I will review labs ordered today and adjust medication regimen as needed.   Checking for vitamin def as a cause for memory issues, but could be related to attention issues.     No follow-ups on file.         Subjective   SUBJECTIVE/OBJECTIVE:  HPI  Chief Complaint   Patient presents with    6 Month Follow-Up     On Depression and HTN. States that the blood pressure has been running high at home.    Memory Loss     Has been forgetting thing and states she feels foggy headed.    Ear Problem     States that the left ear has been itching a lot ane wants it checked out.    Other     Has appointment for Colonoscopy on 24. Has been over 10 years since the last Tdap and Pneumonia vac.   F/U HTN - BP not well controlled. Compliant w/ med(s).  Denies s/s target organ damage.     Iron def anemia treated by hematology.       Still feeling foggy headed and forgetting things.  Sometimes she is asking questions a few times, and people are telling her she just asked the same thing. Wonders if she is not quite paying attention.      She was having headaches

## 2024-02-26 ENCOUNTER — OFFICE VISIT (OUTPATIENT)
Dept: ORTHOPEDIC SURGERY | Age: 44
End: 2024-02-26
Payer: COMMERCIAL

## 2024-02-26 VITALS — BODY MASS INDEX: 44.98 KG/M2 | HEIGHT: 65 IN | WEIGHT: 270 LBS

## 2024-02-26 DIAGNOSIS — G56.02 LEFT CARPAL TUNNEL SYNDROME: Primary | ICD-10-CM

## 2024-02-26 DIAGNOSIS — M77.8 RIGHT HAND TENDONITIS: ICD-10-CM

## 2024-02-26 PROCEDURE — 99203 OFFICE O/P NEW LOW 30 MIN: CPT | Performed by: ORTHOPAEDIC SURGERY

## 2024-02-26 NOTE — PROGRESS NOTES
Orthopaedic Hand Surgery Note    Name: Tongia Elaine Abercrombie  YOB: 1980  Gender: female  MRN: [id number]    CC: New patient referred for hand numbness      HPI: Patient is a 44 y.o. female with a chief complaint of left hand numbness and tingling in the median nerve distribution. The symptoms have been going on for <1 year. She also complains of occasional cramping in the left hand. The patient does complain of night wakening and increased symptoms with driving. Evaluation to date has included none. Treatment to date has included brace on the left. She has occasional stiffness in the right middle finger, no numbness, tingling, or locking    ROS/Meds/PSH/PMH/FH/SH: I personally reviewed the patients standard intake form.  Pertinents are discussed In the HPI    Physical Examination:    Musculoskeletal:       Examination of the left upper extremity demonstrates Decreased sensation to light touch in the median distribution, normal sensation in ulnar and radial distribution, Positive carpal tunnel compression testing and Phalen testing, cap refill < 5 seconds in all fingers. Inspection reveals no thenar atrophy. Negative Tinel and elbow flexion compression test of the cubital tunnel, negative Tinel over Guyon's canal. Sensation to light touch in the ulnar 2 digits is normal with no intrinsic atrophy/weakness. No tenderness to palpation or masses noted in the forearm.  On exam of the right upper extremity, light touch sensation is intact in median, ulnar, radial distributions.  There is no tenderness over the any A1 pulleys, no locking present.  She has full and pain-free finger range of motion.  There is negative carpal tunnel compression and Phalen's test    Imaging / Electrodiagnostic Tests:     none    Assessment:     ICD-10-CM    1. Left carpal tunnel syndrome  G56.02 Ambulatory referral to Neurology      2. Right hand tendonitis  M77.8           Plan:  We discussed the diagnosis and different

## 2024-02-28 ENCOUNTER — TELEPHONE (OUTPATIENT)
Dept: FAMILY MEDICINE CLINIC | Facility: CLINIC | Age: 44
End: 2024-02-28

## 2024-02-28 NOTE — TELEPHONE ENCOUNTER
Patient called and left a message about a cough that she has had for the last 2 weeks. She states that she was coughing up clear stuff, but know it has turned to green/brown in color. She also states that she has been having some wheezing. She has been using Musinex with no help. She wants to see if there is any medication that she can be given to help?

## 2024-02-28 NOTE — TELEPHONE ENCOUNTER
I called the patient back, but got her voice mail box. I left her a message letting her know what Dr. Holder stated. I asked her to please call back if she wants to see about getting an appointment set up.

## 2024-03-01 DIAGNOSIS — D50.0 IRON DEFICIENCY ANEMIA SECONDARY TO BLOOD LOSS (CHRONIC): Primary | ICD-10-CM

## 2024-03-05 ENCOUNTER — TELEPHONE (OUTPATIENT)
Dept: ONCOLOGY | Age: 44
End: 2024-03-05

## 2024-03-05 NOTE — TELEPHONE ENCOUNTER
Pt under impression from dr that she did not need to come back for anymore appts.  Pt would like a call back about her next appt and why it was scheduled (what did the dr order)

## 2024-03-05 NOTE — TELEPHONE ENCOUNTER
Reviewed with pod nurse.  Patient informed the scheduled appointments are not needed and to continue to follow-up with PCP.  Patient verbalized understanding.  Message sent to schedulers.

## 2024-03-06 ENCOUNTER — OFFICE VISIT (OUTPATIENT)
Dept: FAMILY MEDICINE CLINIC | Facility: CLINIC | Age: 44
End: 2024-03-06
Payer: COMMERCIAL

## 2024-03-06 VITALS
HEART RATE: 82 BPM | WEIGHT: 276 LBS | SYSTOLIC BLOOD PRESSURE: 139 MMHG | RESPIRATION RATE: 19 BRPM | BODY MASS INDEX: 45.98 KG/M2 | DIASTOLIC BLOOD PRESSURE: 83 MMHG | HEIGHT: 65 IN | OXYGEN SATURATION: 98 %

## 2024-03-06 DIAGNOSIS — R05.1 ACUTE COUGH: ICD-10-CM

## 2024-03-06 DIAGNOSIS — R06.2 WHEEZING: Primary | ICD-10-CM

## 2024-03-06 PROCEDURE — 94640 AIRWAY INHALATION TREATMENT: CPT

## 2024-03-06 PROCEDURE — 99214 OFFICE O/P EST MOD 30 MIN: CPT

## 2024-03-06 RX ORDER — IPRATROPIUM BROMIDE AND ALBUTEROL SULFATE 2.5; .5 MG/3ML; MG/3ML
1 SOLUTION RESPIRATORY (INHALATION) ONCE
Status: COMPLETED | OUTPATIENT
Start: 2024-03-06 | End: 2024-03-06

## 2024-03-06 RX ORDER — GUAIFENESIN/DEXTROMETHORPHAN 100-10MG/5
5 SYRUP ORAL 3 TIMES DAILY PRN
Qty: 120 ML | Refills: 0 | Status: SHIPPED | OUTPATIENT
Start: 2024-03-06 | End: 2024-03-16

## 2024-03-06 RX ORDER — ALBUTEROL SULFATE 90 UG/1
AEROSOL, METERED RESPIRATORY (INHALATION)
COMMUNITY
Start: 2024-02-29

## 2024-03-06 RX ORDER — AZITHROMYCIN 500 MG/1
TABLET, FILM COATED ORAL
COMMUNITY
Start: 2024-02-29

## 2024-03-06 RX ORDER — PANTOPRAZOLE SODIUM 40 MG/1
40 TABLET, DELAYED RELEASE ORAL DAILY
COMMUNITY
Start: 2024-02-23 | End: 2025-02-22

## 2024-03-06 RX ORDER — IPRATROPIUM BROMIDE AND ALBUTEROL SULFATE 2.5; .5 MG/3ML; MG/3ML
1 SOLUTION RESPIRATORY (INHALATION) EVERY 4 HOURS
Qty: 180 ML | Refills: 0 | Status: SHIPPED | OUTPATIENT
Start: 2024-03-06 | End: 2024-03-16

## 2024-03-06 RX ADMIN — IPRATROPIUM BROMIDE AND ALBUTEROL SULFATE 1 DOSE: 2.5; .5 SOLUTION RESPIRATORY (INHALATION) at 12:28

## 2024-03-06 SDOH — ECONOMIC STABILITY: TRANSPORTATION INSECURITY
IN THE PAST 12 MONTHS, HAS LACK OF TRANSPORTATION KEPT YOU FROM MEETINGS, WORK, OR FROM GETTING THINGS NEEDED FOR DAILY LIVING?: NO

## 2024-03-06 SDOH — ECONOMIC STABILITY: FOOD INSECURITY: WITHIN THE PAST 12 MONTHS, YOU WORRIED THAT YOUR FOOD WOULD RUN OUT BEFORE YOU GOT MONEY TO BUY MORE.: NEVER TRUE

## 2024-03-06 SDOH — ECONOMIC STABILITY: FOOD INSECURITY: WITHIN THE PAST 12 MONTHS, THE FOOD YOU BOUGHT JUST DIDN'T LAST AND YOU DIDN'T HAVE MONEY TO GET MORE.: NEVER TRUE

## 2024-03-06 SDOH — ECONOMIC STABILITY: INCOME INSECURITY: HOW HARD IS IT FOR YOU TO PAY FOR THE VERY BASICS LIKE FOOD, HOUSING, MEDICAL CARE, AND HEATING?: NOT VERY HARD

## 2024-03-06 ASSESSMENT — ENCOUNTER SYMPTOMS
SORE THROAT: 0
SHORTNESS OF BREATH: 1
WHEEZING: 1
COUGH: 1
SINUS PAIN: 0
CHEST TIGHTNESS: 1
SINUS PRESSURE: 0

## 2024-03-06 NOTE — PROGRESS NOTES
[facility]  _______________________________________  MD Teena Benoit, NELLY Givens, MD Jodie Jean MD    [address] [place] [id number]  Phone: [phone]  Fax: [phone]      Tongia Elaine Abercrombie (: 1980) presents today c/o    Chief Complaint   Patient presents with    Cough     Went to [facility] on [id number] for this. Was tested for Covid, Flu and RSV and they were all negative. Has been sick for the past 4 weeks.    Congestion     Will sometimes bring up yellow/brown stuff.         Assessment/Plan:  Wheezing  Assessment & Plan:    Still taking doxycycline. Provided Duo-neb breathing treatment today. Encouraged her to continue albuterol inhaler. Prescribed spacer for patient to use to allow for better absorption. Lungs sound much better after treatment.   Orders:  -     Spacer/Aero-Holding Chambers KAILEY; DAILY Starting Wed 3/6/2024, Disp-1 each, R-0, Normal  -     ipratropium 0.5 mg-albuterol 2.5 mg (DUONEB) nebulizer solution 1 Dose; 1 Dose, Inhalation, ONCE, 1 dose, On Wed 3/6/24 at 1245Initiate RT Bronchodilator Protocol: No  Acute cough  Comments:  Prescribed robitussin DM to take for cough. Tessalon pearls did not work.  Orders:  -     guaiFENesin-dextromethorphan (ROBITUSSIN DM) 100-10 MG/5ML syrup; Take 5 mLs by mouth 3 times daily as needed for Cough, Disp-120 mL, R-0Normal  CXR negative, COVID and flu and RSV negative on 3/1. CBC WDL. See plan above.    Follow up if wheezing, shortness of breath, coughing persists/worsens. As it has been going on for about four weeks, may benefit from PFT.       HPI  Went to the ED on 3/1:  \"Patient is a 44 y.o. y.o. female who presented to the emergency department with expiratory wheeze in the bilateral upper lung fields, patient has some rhonchi crackles in the left lower lung base patient has no signs of any severe sepsis at this time is not tachycardic is on no beta-blocker,

## 2024-03-06 NOTE — ASSESSMENT & PLAN NOTE
Still taking doxycycline. Provided Duo-neb breathing treatment today. Encouraged her to continue albuterol inhaler. Prescribed spacer for patient to use to allow for better absorption. Lungs sound much better after treatment. Daughter has nebulizer at home, prescribed the solution for nebulization treatments to take BID in addition to albuterol inhaler q2-4 hours with spacer.

## 2024-03-12 ENCOUNTER — TELEMEDICINE (OUTPATIENT)
Dept: FAMILY MEDICINE CLINIC | Facility: CLINIC | Age: 44
End: 2024-03-12
Payer: COMMERCIAL

## 2024-03-12 DIAGNOSIS — G89.29 CHRONIC PAIN OF BOTH KNEES: ICD-10-CM

## 2024-03-12 DIAGNOSIS — R53.82 CHRONIC FATIGUE: Primary | ICD-10-CM

## 2024-03-12 DIAGNOSIS — M25.561 CHRONIC PAIN OF BOTH KNEES: ICD-10-CM

## 2024-03-12 DIAGNOSIS — J45.51 SEVERE PERSISTENT ASTHMA WITH ACUTE EXACERBATION: ICD-10-CM

## 2024-03-12 DIAGNOSIS — Z09 HOSPITAL DISCHARGE FOLLOW-UP: ICD-10-CM

## 2024-03-12 DIAGNOSIS — M25.562 CHRONIC PAIN OF BOTH KNEES: ICD-10-CM

## 2024-03-12 PROCEDURE — 99214 OFFICE O/P EST MOD 30 MIN: CPT | Performed by: FAMILY MEDICINE

## 2024-03-12 PROCEDURE — 1111F DSCHRG MED/CURRENT MED MERGE: CPT | Performed by: FAMILY MEDICINE

## 2024-03-12 PROCEDURE — G2211 COMPLEX E/M VISIT ADD ON: HCPCS | Performed by: FAMILY MEDICINE

## 2024-03-12 RX ORDER — PREDNISONE 20 MG/1
TABLET ORAL
COMMUNITY
Start: 2024-03-11

## 2024-03-12 RX ORDER — FLUTICASONE PROPIONATE AND SALMETEROL 250; 50 UG/1; UG/1
1 POWDER RESPIRATORY (INHALATION) EVERY 12 HOURS
Qty: 60 EACH | Refills: 3 | Status: SHIPPED | OUTPATIENT
Start: 2024-03-12

## 2024-03-12 ASSESSMENT — ENCOUNTER SYMPTOMS
CONSTIPATION: 0
DIARRHEA: 0
SHORTNESS OF BREATH: 0
VOMITING: 0
COUGH: 0

## 2024-03-12 NOTE — PROGRESS NOTES
Tongia Elaine Abercrombie (:  1980) is a Established patient, here for evaluation of the following:    Assessment & Plan   Below is the assessment and plan developed based on review of pertinent history, physical exam, labs, studies, and medications.  1. Chronic fatigue  -     Sedimentation Rate; Future  -     MAKENZIE, Direct, w/Reflex; Future  -     CBC with Auto Differential; Future  2. Chronic pain of both knees  -     Sedimentation Rate; Future  3. Severe persistent asthma with acute exacerbation  -     fluticasone-salmeterol (ADVAIR) 250-50 MCG/ACT AEPB diskus inhaler; Inhale 1 puff into the lungs in the morning and 1 puff in the evening., Disp-60 each, R-3Normal  W/u for autoimmune/immune issues due to body pain, recurrent infections.  Will do wixella as pulmicort inhaler has been unavailable at the pharmacy.   No follow-ups on file.       Subjective   HPI  Chief Complaint   Patient presents with    Follow-Up from Hospital     Just got yesterday from being in the ICU at [facility]. Was in there for 4 days.   Hospitalized in the ICU on bipap initially for suspected asthma exacerbation.  Referred to pulmonology for formal PFTs.  Taking budesonide inhaler bid and albuterol prn and a prednisone taper.  She was also given antibiotics at some point.  They ruled out blood clot since she had been off of eliquis for 8 days for an upcoming colonoscopy. She reports that she was having such extreme coughing that she is urinating on herself. She gets dizzy with coughing episodes. She has had exertional dyspnea where she has to take breaks when walking into work.     Hasn't been taking budesonide due to pharmacy not having it.   She was referred to pulmonology but doesn't have an appointment set.     She is concerned about her persistent medical issues over the last few years.      Large PE in  and has been on eliquis.  She will be on it until about July    She notices that she has had hair loss.     Review of

## 2024-03-16 ENCOUNTER — HOSPITAL ENCOUNTER (OUTPATIENT)
Dept: MAMMOGRAPHY | Age: 44
End: 2024-03-16
Payer: COMMERCIAL

## 2024-03-16 DIAGNOSIS — Z12.31 SCREENING MAMMOGRAM FOR BREAST CANCER: ICD-10-CM

## 2024-03-16 PROCEDURE — 77063 BREAST TOMOSYNTHESIS BI: CPT

## 2024-03-26 ENCOUNTER — TELEPHONE (OUTPATIENT)
Dept: FAMILY MEDICINE CLINIC | Facility: CLINIC | Age: 44
End: 2024-03-26

## 2024-03-26 NOTE — TELEPHONE ENCOUNTER
----- Message from Arlen Bergman sent at 3/26/2024 12:45 PM EDT -----  Subject: Message to Provider    QUESTIONS  Information for Provider? Patient needs a Lab appointment.  ---------------------------------------------------------------------------  --------------  CALL BACK INFO  [phone]; OK to leave message on voicemail  ---------------------------------------------------------------------------  --------------  SCRIPT ANSWERS  Relationship to Patient? Self

## 2024-03-28 ENCOUNTER — PROCEDURE VISIT (OUTPATIENT)
Dept: NEUROLOGY | Age: 44
End: 2024-03-28
Payer: COMMERCIAL

## 2024-03-28 VITALS — WEIGHT: 272 LBS | BODY MASS INDEX: 45.26 KG/M2 | OXYGEN SATURATION: 93 % | HEART RATE: 62 BPM

## 2024-03-28 DIAGNOSIS — M25.532 LEFT WRIST PAIN: ICD-10-CM

## 2024-03-28 DIAGNOSIS — R20.0 NUMBNESS AND TINGLING IN LEFT HAND: Primary | ICD-10-CM

## 2024-03-28 DIAGNOSIS — R20.2 NUMBNESS AND TINGLING IN LEFT HAND: Primary | ICD-10-CM

## 2024-03-28 DIAGNOSIS — G56.02 CARPAL TUNNEL SYNDROME ON LEFT: ICD-10-CM

## 2024-03-28 PROCEDURE — 95885 MUSC TST DONE W/NERV TST LIM: CPT | Performed by: PSYCHIATRY & NEUROLOGY

## 2024-03-28 PROCEDURE — 95910 NRV CNDJ TEST 7-8 STUDIES: CPT | Performed by: PSYCHIATRY & NEUROLOGY

## 2024-03-28 NOTE — PROGRESS NOTES
EMG/Nerve Conduction Study Procedure Note  [address]    [address]  [place]  [address]   [phone]      Hx:    Exam:     44 y.o.  M B/AA female  RH   no atrophy  -EMG upper extremities left-sided only left hand numbness paresthesia.  Median distribution.  No Rigoberto.  No Marivel.    Referring: Dr. Monique novoa  Technologist: Neetu Gonzalez  Height: 5 foot 5 inch        Summary         needle EMG select muscle left hand with CV.             Controlled environmental factors / EMG lab.  Temperature.   NCV : sensory segments:    Abnormal = slowing of the left median distal SCV.  Normal left ulnar left radial SCV.  NCV transcarpal sensory segments:     Abnormal left median transcarpal segments are slowed with normal left transcarpal ulnar segments and a peak difference of velocity at 0.69 ms (UL = 0.20 ms).  NCV Motor MCV segments:     Normal left median and left ulnar MCV.  F-wave studies:         Normal left median left ulnar F-wave latencies.   NEEDLE EMG:   Tested muscles::    Normal left APB = Normal insertional activity and interference pattern/recruitment.  No fasciculations fibrillations positive sharp waves.  Normal MUP.  No BSS AP.  No giant MUP.  No myotonia.  No upper motor neuron sign.          INTERPRETATION:     THESE FINDINGS ARE ELECTROPHYSIOLOGIC EVIDENCE OF EARLY ENTRAPMENT OF THE LEFT MEDIAN NERVE AT THE WRIST.  NO AXONAL DENERVATION.            CONCLUSION:      Compatible with early left carpal tunnel syndrome.      Procedure Details:       Correlates with early carpal tunnel syndrome on the left.            Patient made aware.          Please Note::     Data and waveforms * filed under Procedure category [facility].    See Procedure Files for complete data pages.       [ *NOTE:  parts or all of this consultation are produced using artificial voice recognition software.  Some speech errors are inherent in such software and may be included in the produced record. ]           Can WARD

## 2024-04-08 ENCOUNTER — PATIENT MESSAGE (OUTPATIENT)
Dept: FAMILY MEDICINE CLINIC | Facility: CLINIC | Age: 44
End: 2024-04-08

## 2024-04-08 NOTE — TELEPHONE ENCOUNTER
From: Tongia Elaine Abercrombie  To: Dr. Jodie Skelton  Sent: 4/8/2024 10:21 AM EDT  Subject: Refill     Can I get a refill of Cetirizine sent to [facility] ?

## 2024-04-10 DIAGNOSIS — R53.82 CHRONIC FATIGUE: ICD-10-CM

## 2024-04-10 DIAGNOSIS — M25.561 CHRONIC PAIN OF BOTH KNEES: ICD-10-CM

## 2024-04-10 DIAGNOSIS — M25.562 CHRONIC PAIN OF BOTH KNEES: ICD-10-CM

## 2024-04-10 DIAGNOSIS — G89.29 CHRONIC PAIN OF BOTH KNEES: ICD-10-CM

## 2024-04-10 LAB
BASOPHILS # BLD: 0 K/UL (ref 0–0.2)
BASOPHILS NFR BLD: 1 % (ref 0–2)
DIFFERENTIAL METHOD BLD: ABNORMAL
EOSINOPHIL # BLD: 0.2 K/UL (ref 0–0.8)
EOSINOPHIL NFR BLD: 3 % (ref 0.5–7.8)
ERYTHROCYTE [DISTWIDTH] IN BLOOD BY AUTOMATED COUNT: 13.9 % (ref 11.9–14.6)
ERYTHROCYTE [SEDIMENTATION RATE] IN BLOOD: 21 MM/HR (ref 0–20)
HCT VFR BLD AUTO: 39.1 % (ref 35.8–46.3)
HGB BLD-MCNC: 11.9 G/DL (ref 11.7–15.4)
IMM GRANULOCYTES # BLD AUTO: 0 K/UL (ref 0–0.5)
IMM GRANULOCYTES NFR BLD AUTO: 0 % (ref 0–5)
LYMPHOCYTES # BLD: 2.9 K/UL (ref 0.5–4.6)
LYMPHOCYTES NFR BLD: 39 % (ref 13–44)
MCH RBC QN AUTO: 27.3 PG (ref 26.1–32.9)
MCHC RBC AUTO-ENTMCNC: 30.4 G/DL (ref 31.4–35)
MCV RBC AUTO: 89.7 FL (ref 82–102)
MONOCYTES # BLD: 0.7 K/UL (ref 0.1–1.3)
MONOCYTES NFR BLD: 9 % (ref 4–12)
NEUTS SEG # BLD: 3.6 K/UL (ref 1.7–8.2)
NEUTS SEG NFR BLD: 48 % (ref 43–78)
NRBC # BLD: 0 K/UL (ref 0–0.2)
PLATELET # BLD AUTO: 335 K/UL (ref 150–450)
PMV BLD AUTO: 10.7 FL (ref 9.4–12.3)
RBC # BLD AUTO: 4.36 M/UL (ref 4.05–5.2)
WBC # BLD AUTO: 7.4 K/UL (ref 4.3–11.1)

## 2024-04-11 LAB — ANA SER QL: NEGATIVE

## 2024-04-12 RX ORDER — CETIRIZINE HYDROCHLORIDE 10 MG/1
10 TABLET ORAL DAILY
Qty: 90 TABLET | Refills: 0 | Status: SHIPPED | OUTPATIENT
Start: 2024-04-12

## 2024-05-07 ENCOUNTER — OFFICE VISIT (OUTPATIENT)
Age: 44
End: 2024-05-07
Payer: COMMERCIAL

## 2024-05-07 DIAGNOSIS — G56.02 LEFT CARPAL TUNNEL SYNDROME: Primary | ICD-10-CM

## 2024-05-07 PROCEDURE — 99214 OFFICE O/P EST MOD 30 MIN: CPT | Performed by: ORTHOPAEDIC SURGERY

## 2024-05-07 NOTE — PROGRESS NOTES
Orthopaedic Hand Surgery Note    Name: Tongia Elaine Abercrombie  YOB: 1980  Gender: female  MRN: [id number]    CC: Follow up of hand numbness    HPI: Patient is a 44 y.o. female who is here regarding follow up for  hand numbness and tingling. She is here to review her nerve conduction study.    ROS/Meds/PSH/PMH/FH/SH: I personally reviewed the patients standard intake form.  Pertinents are discussed in the HPI    Physical Examination:  Musculoskeletal:       Examination of the left upper extremity demonstrates Normal sensation to light touch in the median distribution, normal sensation in ulnar and radial distribution, Positive carpal tunnel compression testing and Phalen testing, cap refill < 5 seconds in all fingers. Inspection reveals no thenar atrophy. Negative Tinel and elbow flexion compression test of the cubital tunnel, negative Tinel over Guyon's canal. Sensation to light touch in the ulnar 2 digits is normal with no intrinsic atrophy/weakness. No tenderness to palpation or masses noted in the forearm.    Imaging / Electrodiagnostic Tests:     I independently reviewed and interpreted the patient's nerve conduction study.  She has findings consistent with mild left carpal tunnel syndrome    Assessment:     ICD-10-CM    1. Left carpal tunnel syndrome  G56.02           Plan:  We discussed the diagnosis and different treatment options. We discussed observation, EMG/NCV, night splinting, cortisone injections and surgical decompression and the risks and benefits of all were clearly outlined. After discussing in detail the patient elects to proceed with continued use of her brace at nighttime.  She does not feel like her symptoms are bad enough for cortisone injection or surgical treatment at this time.  She will follow-up as needed.     Patient voiced accordance and understanding of the information provided and the formulated plan. All questions were answered to the patient's satisfaction

## 2024-07-23 ENCOUNTER — HOSPITAL ENCOUNTER (OUTPATIENT)
Dept: LAB | Age: 44
Discharge: HOME | End: 2024-07-26
Payer: COMMERCIAL

## 2024-07-23 ENCOUNTER — OFFICE VISIT (OUTPATIENT)
Dept: ONCOLOGY | Age: 44
End: 2024-07-23
Payer: COMMERCIAL

## 2024-07-23 VITALS
WEIGHT: 275 LBS | HEIGHT: 65 IN | BODY MASS INDEX: 45.82 KG/M2 | HEART RATE: 76 BPM | SYSTOLIC BLOOD PRESSURE: 128 MMHG | TEMPERATURE: 97.9 F | OXYGEN SATURATION: 100 % | RESPIRATION RATE: 18 BRPM | DIASTOLIC BLOOD PRESSURE: 83 MMHG

## 2024-07-23 DIAGNOSIS — Z86.711 HISTORY OF PULMONARY EMBOLISM: Primary | ICD-10-CM

## 2024-07-23 DIAGNOSIS — D50.0 IRON DEFICIENCY ANEMIA SECONDARY TO BLOOD LOSS (CHRONIC): ICD-10-CM

## 2024-07-23 DIAGNOSIS — N92.1 EXCESSIVE AND FREQUENT MENSTRUATION WITH IRREGULAR CYCLE: ICD-10-CM

## 2024-07-23 LAB
ALBUMIN SERPL-MCNC: 3.6 G/DL (ref 3.5–5)
ALBUMIN/GLOB SERPL: 1.1 (ref 1–1.9)
ALP SERPL-CCNC: 59 U/L (ref 35–104)
ALT SERPL-CCNC: 18 U/L (ref 12–65)
ANION GAP SERPL CALC-SCNC: 10 MMOL/L (ref 9–18)
AST SERPL-CCNC: 29 U/L (ref 15–37)
BASOPHILS # BLD: 0.1 K/UL (ref 0–0.2)
BASOPHILS NFR BLD: 1 % (ref 0–2)
BILIRUB SERPL-MCNC: 0.2 MG/DL (ref 0–1.2)
BUN SERPL-MCNC: 11 MG/DL (ref 6–23)
CALCIUM SERPL-MCNC: 9.1 MG/DL (ref 8.8–10.2)
CHLORIDE SERPL-SCNC: 103 MMOL/L (ref 98–107)
CO2 SERPL-SCNC: 25 MMOL/L (ref 20–28)
CREAT SERPL-MCNC: 1.06 MG/DL (ref 0.6–1.1)
DIFFERENTIAL METHOD BLD: ABNORMAL
EOSINOPHIL # BLD: 0.3 K/UL (ref 0–0.8)
EOSINOPHIL NFR BLD: 4 % (ref 0.5–7.8)
ERYTHROCYTE [DISTWIDTH] IN BLOOD BY AUTOMATED COUNT: 13.3 % (ref 11.9–14.6)
FERRITIN SERPL-MCNC: 768 NG/ML (ref 8–388)
GLOBULIN SER CALC-MCNC: 3.3 G/DL (ref 2.3–3.5)
GLUCOSE SERPL-MCNC: 81 MG/DL (ref 70–99)
HCT VFR BLD AUTO: 40.5 % (ref 35.8–46.3)
HGB BLD-MCNC: 12.5 G/DL (ref 11.7–15.4)
IMM GRANULOCYTES # BLD AUTO: 0 K/UL (ref 0–0.5)
IMM GRANULOCYTES NFR BLD AUTO: 0 % (ref 0–5)
IRON SATN MFR SERPL: 18 % (ref 20–50)
IRON SERPL-MCNC: 49 UG/DL (ref 35–100)
LYMPHOCYTES # BLD: 2.8 K/UL (ref 0.5–4.6)
LYMPHOCYTES NFR BLD: 41 % (ref 13–44)
MCH RBC QN AUTO: 26.7 PG (ref 26.1–32.9)
MCHC RBC AUTO-ENTMCNC: 30.9 G/DL (ref 31.4–35)
MCV RBC AUTO: 86.5 FL (ref 82–102)
MONOCYTES # BLD: 0.7 K/UL (ref 0.1–1.3)
MONOCYTES NFR BLD: 10 % (ref 4–12)
NEUTS SEG # BLD: 3 K/UL (ref 1.7–8.2)
NEUTS SEG NFR BLD: 44 % (ref 43–78)
NRBC # BLD: 0 K/UL (ref 0–0.2)
PLATELET # BLD AUTO: 315 K/UL (ref 150–450)
PMV BLD AUTO: 10.5 FL (ref 9.4–12.3)
POTASSIUM SERPL-SCNC: 4.9 MMOL/L (ref 3.5–5.1)
PROT SERPL-MCNC: 6.9 G/DL (ref 6.3–8.2)
RBC # BLD AUTO: 4.68 M/UL (ref 4.05–5.2)
SODIUM SERPL-SCNC: 138 MMOL/L (ref 136–145)
TIBC SERPL-MCNC: 266 UG/DL (ref 240–450)
UIBC SERPL-MCNC: 217 UG/DL (ref 112–347)
WBC # BLD AUTO: 6.8 K/UL (ref 4.3–11.1)

## 2024-07-23 PROCEDURE — 80053 COMPREHEN METABOLIC PANEL: CPT

## 2024-07-23 PROCEDURE — 83540 ASSAY OF IRON: CPT

## 2024-07-23 PROCEDURE — 83550 IRON BINDING TEST: CPT

## 2024-07-23 PROCEDURE — 82728 ASSAY OF FERRITIN: CPT

## 2024-07-23 PROCEDURE — 36415 COLL VENOUS BLD VENIPUNCTURE: CPT

## 2024-07-23 PROCEDURE — 85025 COMPLETE CBC W/AUTO DIFF WBC: CPT

## 2024-07-23 PROCEDURE — 99214 OFFICE O/P EST MOD 30 MIN: CPT | Performed by: INTERNAL MEDICINE

## 2024-07-23 RX ORDER — SODIUM CHLORIDE 0.9 % (FLUSH) 0.9 %
5-40 SYRINGE (ML) INJECTION PRN
OUTPATIENT
Start: 2024-07-29

## 2024-07-23 RX ORDER — ACETAMINOPHEN 325 MG/1
650 TABLET ORAL
OUTPATIENT
Start: 2024-07-29

## 2024-07-23 RX ORDER — EPINEPHRINE 1 MG/ML
0.3 INJECTION, SOLUTION, CONCENTRATE INTRAVENOUS PRN
OUTPATIENT
Start: 2024-07-29

## 2024-07-23 RX ORDER — ALBUTEROL SULFATE 90 UG/1
4 AEROSOL, METERED RESPIRATORY (INHALATION) PRN
OUTPATIENT
Start: 2024-07-29

## 2024-07-23 RX ORDER — DIPHENHYDRAMINE HYDROCHLORIDE 50 MG/ML
50 INJECTION INTRAMUSCULAR; INTRAVENOUS
OUTPATIENT
Start: 2024-07-29

## 2024-07-23 RX ORDER — SODIUM CHLORIDE 9 MG/ML
5-250 INJECTION, SOLUTION INTRAVENOUS PRN
OUTPATIENT
Start: 2024-07-29

## 2024-07-23 RX ORDER — FAMOTIDINE 10 MG/ML
20 INJECTION, SOLUTION INTRAVENOUS
OUTPATIENT
Start: 2024-07-29

## 2024-07-23 RX ORDER — ONDANSETRON 2 MG/ML
8 INJECTION INTRAMUSCULAR; INTRAVENOUS
OUTPATIENT
Start: 2024-07-29

## 2024-07-23 RX ORDER — HEPARIN SODIUM (PORCINE) LOCK FLUSH IV SOLN 100 UNIT/ML 100 UNIT/ML
500 SOLUTION INTRAVENOUS PRN
OUTPATIENT
Start: 2024-07-29

## 2024-07-23 RX ORDER — SODIUM CHLORIDE 9 MG/ML
INJECTION, SOLUTION INTRAVENOUS CONTINUOUS
OUTPATIENT
Start: 2024-07-29

## 2024-07-23 NOTE — PATIENT INSTRUCTIONS
Patient Information from Today's Visit    The members of your [facility] are listed below:    Physician Provider: Tammy Mixon Medical Oncologist  Advanced Practice Clinician: Karen Johnson NP  Registered Nurse: JAMAICA Dhaliwal  Navigator: N/A  Medical Assistant: Saige ANSARI MA  : Rosa Maria MISTRY   Supportive Care Services: Liam BRAY LMSW    Diagnosis: Anemia      Follow Up Instructions:   -finish the Eliquis you have and then you'll be done  -we will set up one more iron infusion      Treatment Summary has been discussed and given to patient:No      Current Labs:   Hospital Outpatient Visit on 07/23/2024   Component Date Value Ref Range Status    WBC 07/23/2024 6.8  4.3 - 11.1 K/uL Final    RBC 07/23/2024 4.68  4.05 - 5.2 M/uL Final    Hemoglobin 07/23/2024 12.5  11.7 - 15.4 g/dL Final    Hematocrit 07/23/2024 40.5  35.8 - 46.3 % Final    MCV 07/23/2024 86.5  82.0 - 102.0 FL Final    MCH 07/23/2024 26.7  26.1 - 32.9 PG Final    MCHC 07/23/2024 30.9 (L)  31.4 - 35.0 g/dL Final    RDW 07/23/2024 13.3  11.9 - 14.6 % Final    Platelets 07/23/2024 315  150 - 450 K/uL Final    MPV 07/23/2024 10.5  9.4 - 12.3 FL Final    nRBC 07/23/2024 0.00  0.0 - 0.2 K/uL Final    **Note: Absolute NRBC parameter is now reported with Hemogram**    Neutrophils % 07/23/2024 44  43 - 78 % Final    Lymphocytes % 07/23/2024 41  13 - 44 % Final    Monocytes % 07/23/2024 10  4.0 - 12.0 % Final    Eosinophils % 07/23/2024 4  0.5 - 7.8 % Final    Basophils % 07/23/2024 1  0.0 - 2.0 % Final    Immature Granulocytes % 07/23/2024 0  0.0 - 5.0 % Final    Neutrophils Absolute 07/23/2024 3.0  1.7 - 8.2 K/UL Final    Lymphocytes Absolute 07/23/2024 2.8  0.5 - 4.6 K/UL Final    Monocytes Absolute 07/23/2024 0.7  0.1 - 1.3 K/UL Final    Eosinophils Absolute 07/23/2024 0.3  0.0 - 0.8 K/UL Final    Basophils Absolute 07/23/2024 0.1  0.0 - 0.2 K/UL Final    Immature Granulocytes Absolute 07/23/2024 0.0  0.0 - 0.5 K/UL Final    Differential Type

## 2024-07-23 NOTE — PROGRESS NOTES
normal.  No respiratory distress.  No wheezes.  No rales.  No tenderness.    CVS Normal rate, regular rhythm and normal heart sounds.  Exam reveals no gallop, no friction and no rub.  No murmur heard.   Abdomen Soft. Bowel sounds are normal. Exhibits no distension. There is no tenderness. There is no rebound and no guarding.   Neuro Normal reflexes.  No cranial nerve deficit.  Exhibits normal muscle tone, 5 of 5 strength of all extremities.   MSK Normal range of motion in general.  No edema and no tenderness.   Psych Normal mood, affect, behavior, judgment and thought content      Labs:  Recent Results (from the past 24 hour(s))   CBC with Auto Differential    Collection Time: 07/23/24  3:04 PM   Result Value Ref Range    WBC 6.8 4.3 - 11.1 K/uL    RBC 4.68 4.05 - 5.2 M/uL    Hemoglobin 12.5 11.7 - 15.4 g/dL    Hematocrit 40.5 35.8 - 46.3 %    MCV 86.5 82.0 - 102.0 FL    MCH 26.7 26.1 - 32.9 PG    MCHC 30.9 (L) 31.4 - 35.0 g/dL    RDW 13.3 11.9 - 14.6 %    Platelets 315 150 - 450 K/uL    MPV 10.5 9.4 - 12.3 FL    nRBC 0.00 0.0 - 0.2 K/uL    Neutrophils % 44 43 - 78 %    Lymphocytes % 41 13 - 44 %    Monocytes % 10 4.0 - 12.0 %    Eosinophils % 4 0.5 - 7.8 %    Basophils % 1 0.0 - 2.0 %    Immature Granulocytes % 0 0.0 - 5.0 %    Neutrophils Absolute 3.0 1.7 - 8.2 K/UL    Lymphocytes Absolute 2.8 0.5 - 4.6 K/UL    Monocytes Absolute 0.7 0.1 - 1.3 K/UL    Eosinophils Absolute 0.3 0.0 - 0.8 K/UL    Basophils Absolute 0.1 0.0 - 0.2 K/UL    Immature Granulocytes Absolute 0.0 0.0 - 0.5 K/UL    Differential Type AUTOMATED     Comprehensive Metabolic Panel    Collection Time: 07/23/24  3:04 PM   Result Value Ref Range    Sodium 138 136 - 145 mmol/L    Potassium 4.9 3.5 - 5.1 mmol/L    Chloride 103 98 - 107 mmol/L    CO2 25 20 - 28 mmol/L    Anion Gap 10 9 - 18 mmol/L    Glucose 81 70 - 99 mg/dL    BUN 11 6 - 23 MG/DL    Creatinine 1.06 0.60 - 1.10 MG/DL    Est, Glom Filt Rate 66 >60 ml/min/1.73m2    Calcium 9.1 8.8 - 10.2

## 2024-07-30 RX ORDER — CETIRIZINE HYDROCHLORIDE 10 MG/1
10 TABLET ORAL DAILY
Qty: 90 TABLET | Refills: 0 | Status: SHIPPED | OUTPATIENT
Start: 2024-07-30

## 2024-08-03 ENCOUNTER — HOSPITAL ENCOUNTER (OUTPATIENT)
Dept: INFUSION THERAPY | Age: 44
Setting detail: INFUSION SERIES
Discharge: HOME | End: 2024-08-03
Payer: COMMERCIAL

## 2024-08-03 VITALS
TEMPERATURE: 97.9 F | SYSTOLIC BLOOD PRESSURE: 119 MMHG | RESPIRATION RATE: 18 BRPM | HEART RATE: 74 BPM | DIASTOLIC BLOOD PRESSURE: 76 MMHG | OXYGEN SATURATION: 98 %

## 2024-08-03 DIAGNOSIS — D50.0 IRON DEFICIENCY ANEMIA DUE TO CHRONIC BLOOD LOSS: Primary | ICD-10-CM

## 2024-08-03 PROCEDURE — 96365 THER/PROPH/DIAG IV INF INIT: CPT

## 2024-08-03 PROCEDURE — 2580000003 HC RX 258: Performed by: INTERNAL MEDICINE

## 2024-08-03 PROCEDURE — 6360000002 HC RX W HCPCS: Performed by: INTERNAL MEDICINE

## 2024-08-03 RX ORDER — SODIUM CHLORIDE 9 MG/ML
5-250 INJECTION, SOLUTION INTRAVENOUS PRN
Status: DISCONTINUED | OUTPATIENT
Start: 2024-08-03 | End: 2024-08-04 | Stop reason: HOSPADM

## 2024-08-03 RX ORDER — ONDANSETRON 2 MG/ML
8 INJECTION INTRAMUSCULAR; INTRAVENOUS
Status: CANCELLED | OUTPATIENT
Start: 2024-08-03

## 2024-08-03 RX ORDER — HEPARIN 100 UNIT/ML
500 SYRINGE INTRAVENOUS PRN
Status: DISCONTINUED | OUTPATIENT
Start: 2024-08-03 | End: 2024-08-04 | Stop reason: HOSPADM

## 2024-08-03 RX ORDER — SODIUM CHLORIDE 0.9 % (FLUSH) 0.9 %
5-40 SYRINGE (ML) INJECTION PRN
Status: CANCELLED | OUTPATIENT
Start: 2024-08-03

## 2024-08-03 RX ORDER — ONDANSETRON 2 MG/ML
8 INJECTION INTRAMUSCULAR; INTRAVENOUS
Status: DISCONTINUED | OUTPATIENT
Start: 2024-08-03 | End: 2024-08-04 | Stop reason: HOSPADM

## 2024-08-03 RX ORDER — SODIUM CHLORIDE 9 MG/ML
5-250 INJECTION, SOLUTION INTRAVENOUS PRN
Status: CANCELLED | OUTPATIENT
Start: 2024-08-03

## 2024-08-03 RX ORDER — EPINEPHRINE 1 MG/ML
0.3 INJECTION, SOLUTION, CONCENTRATE INTRAVENOUS PRN
Status: CANCELLED | OUTPATIENT
Start: 2024-08-03

## 2024-08-03 RX ORDER — SODIUM CHLORIDE 0.9 % (FLUSH) 0.9 %
5-40 SYRINGE (ML) INJECTION PRN
Status: DISCONTINUED | OUTPATIENT
Start: 2024-08-03 | End: 2024-08-04 | Stop reason: HOSPADM

## 2024-08-03 RX ORDER — DIPHENHYDRAMINE HYDROCHLORIDE 50 MG/ML
50 INJECTION INTRAMUSCULAR; INTRAVENOUS
Status: CANCELLED | OUTPATIENT
Start: 2024-08-03

## 2024-08-03 RX ORDER — ACETAMINOPHEN 325 MG/1
650 TABLET ORAL
Status: CANCELLED | OUTPATIENT
Start: 2024-08-03

## 2024-08-03 RX ORDER — ALBUTEROL SULFATE 90 UG/1
4 AEROSOL, METERED RESPIRATORY (INHALATION) PRN
Status: CANCELLED | OUTPATIENT
Start: 2024-08-03

## 2024-08-03 RX ORDER — HEPARIN 100 UNIT/ML
500 SYRINGE INTRAVENOUS PRN
Status: CANCELLED | OUTPATIENT
Start: 2024-08-03

## 2024-08-03 RX ORDER — SODIUM CHLORIDE 9 MG/ML
INJECTION, SOLUTION INTRAVENOUS CONTINUOUS
Status: CANCELLED | OUTPATIENT
Start: 2024-08-03

## 2024-08-03 RX ORDER — ACETAMINOPHEN 325 MG/1
650 TABLET ORAL
Status: DISCONTINUED | OUTPATIENT
Start: 2024-08-03 | End: 2024-08-04 | Stop reason: HOSPADM

## 2024-08-03 RX ORDER — EPINEPHRINE 1 MG/ML
0.3 INJECTION, SOLUTION, CONCENTRATE INTRAVENOUS PRN
Status: DISCONTINUED | OUTPATIENT
Start: 2024-08-03 | End: 2024-08-04 | Stop reason: HOSPADM

## 2024-08-03 RX ORDER — ALBUTEROL SULFATE 90 UG/1
4 AEROSOL, METERED RESPIRATORY (INHALATION) PRN
Status: DISCONTINUED | OUTPATIENT
Start: 2024-08-03 | End: 2024-08-04 | Stop reason: HOSPADM

## 2024-08-03 RX ORDER — DIPHENHYDRAMINE HYDROCHLORIDE 50 MG/ML
50 INJECTION INTRAMUSCULAR; INTRAVENOUS
Status: DISCONTINUED | OUTPATIENT
Start: 2024-08-03 | End: 2024-08-04 | Stop reason: HOSPADM

## 2024-08-03 RX ORDER — SODIUM CHLORIDE 9 MG/ML
INJECTION, SOLUTION INTRAVENOUS CONTINUOUS
Status: DISCONTINUED | OUTPATIENT
Start: 2024-08-03 | End: 2024-08-04 | Stop reason: HOSPADM

## 2024-08-03 RX ADMIN — SODIUM CHLORIDE 750 MG: 9 INJECTION, SOLUTION INTRAVENOUS at 10:59

## 2024-08-03 RX ADMIN — SODIUM CHLORIDE 75 ML/HR: 9 INJECTION, SOLUTION INTRAVENOUS at 10:58

## 2024-08-03 NOTE — PROGRESS NOTES
Arrived to the Infusion Center.  Iron completed. Patient tolerated well.   Any issues or concerns during appointment: None.  Patient aware no future infusion appointments.  Patient to follow up with physician.  Patient instructed to call provider with temperature of 100.4 or greater or nausea/vomiting/ diarrhea or pain not controlled by medications  Discharged ambulatory in stable condition.

## 2024-08-05 ENCOUNTER — OFFICE VISIT (OUTPATIENT)
Dept: PRIMARY CARE CLINIC | Facility: CLINIC | Age: 44
End: 2024-08-05

## 2024-08-05 VITALS
HEIGHT: 65 IN | HEART RATE: 70 BPM | BODY MASS INDEX: 45.79 KG/M2 | TEMPERATURE: 97.8 F | WEIGHT: 274.8 LBS | SYSTOLIC BLOOD PRESSURE: 133 MMHG | DIASTOLIC BLOOD PRESSURE: 77 MMHG | OXYGEN SATURATION: 99 %

## 2024-08-05 DIAGNOSIS — I10 PRIMARY HYPERTENSION: ICD-10-CM

## 2024-08-05 DIAGNOSIS — Z79.899 ENCOUNTER FOR LONG-TERM (CURRENT) USE OF MEDICATIONS: ICD-10-CM

## 2024-08-05 DIAGNOSIS — Z76.89 ENCOUNTER TO ESTABLISH CARE: Primary | ICD-10-CM

## 2024-08-05 DIAGNOSIS — Z86.711 HISTORY OF PULMONARY EMBOLISM: ICD-10-CM

## 2024-08-05 PROBLEM — I26.09 ACUTE PULMONARY EMBOLISM WITH ACUTE COR PULMONALE (HCC): Status: RESOLVED | Noted: 2022-12-18 | Resolved: 2024-08-05

## 2024-08-05 PROBLEM — G47.33 OSA (OBSTRUCTIVE SLEEP APNEA): Status: ACTIVE | Noted: 2024-07-23

## 2024-08-05 PROBLEM — J96.02 ACUTE RESPIRATORY FAILURE WITH HYPOXIA AND HYPERCAPNIA (HCC): Status: RESOLVED | Noted: 2022-12-18 | Resolved: 2024-08-05

## 2024-08-05 PROBLEM — Z86.010 HISTORY OF COLON POLYPS: Status: ACTIVE | Noted: 2020-07-06

## 2024-08-05 PROBLEM — Z86.0100 HISTORY OF COLON POLYPS: Status: ACTIVE | Noted: 2020-07-06

## 2024-08-05 PROBLEM — J96.01 ACUTE RESPIRATORY FAILURE WITH HYPOXIA AND HYPERCAPNIA (HCC): Status: RESOLVED | Noted: 2022-12-18 | Resolved: 2024-08-05

## 2024-08-05 RX ORDER — DOXEPIN HYDROCHLORIDE 10 MG/1
10 CAPSULE ORAL NIGHTLY
COMMUNITY
Start: 2024-07-15

## 2024-08-05 RX ORDER — FAMOTIDINE 40 MG/1
40 TABLET, FILM COATED ORAL NIGHTLY
COMMUNITY
Start: 2024-06-18

## 2024-08-05 RX ORDER — FLUTICASONE PROPIONATE 50 MCG
SPRAY, SUSPENSION (ML) NASAL
COMMUNITY
Start: 2024-07-23

## 2024-08-05 RX ORDER — CANDESARTAN 16 MG/1
16 TABLET ORAL DAILY
Qty: 90 TABLET | Refills: 3 | Status: SHIPPED | OUTPATIENT
Start: 2024-08-05

## 2024-08-05 RX ORDER — MONTELUKAST SODIUM 10 MG/1
10 TABLET ORAL DAILY
COMMUNITY
Start: 2024-07-23 | End: 2025-07-23

## 2024-08-05 SDOH — HEALTH STABILITY: PHYSICAL HEALTH: ON AVERAGE, HOW MANY DAYS PER WEEK DO YOU ENGAGE IN MODERATE TO STRENUOUS EXERCISE (LIKE A BRISK WALK)?: 0 DAYS

## 2024-08-05 SDOH — HEALTH STABILITY: PHYSICAL HEALTH: ON AVERAGE, HOW MANY MINUTES DO YOU ENGAGE IN EXERCISE AT THIS LEVEL?: 0 MIN

## 2024-08-05 NOTE — PROGRESS NOTES
[facility] PRIMARY CARE  Dary Gavin M.D.  [address] [place] [address]  Phone:  [phone]  Fax:  [phone]    CHIEF COMPLAINT:  Chief Complaint   Patient presents with    New Patient     Here to establish care, patient states her former pcp is not working very much and it is hard to get into. Pt has pmh of htn, anxiety and several other problems managed by specialists. Patient states she sees pulmonology for respiratory failure and PE, Hematology for anemia and PE and GI for GERD and hiatal hernia.         HISTORY OF PRESENT ILLNESS:  Ms. Abercrombie is a 44 y.o. female  who presents as a new patient. She has a history of hypertension. She takes Atacand 16 mg daily. Her blood pressure is good today at 133/77. Denies chest pain, shortness of breath, headaches or blurred vision.    She has a history of a pulmonary embolism diagnosed in 2020. She continues to see the pulmonologist. She is now on Eliquis. She was in the ICU at the beginning of the year. She uses albuterol as needed.     She has a history of palpitations. She wore a heart monitor for a few days. Everything was normal.    She has a history of a hiatal hernia. She will see the GI in 2 days. She had an EGD and colonoscopy. She has colon polyps.    She has been getting iron infusions. Her next iron infusion is this weekend.     No other complaints. Taking medications as prescribed. Medications reviewed and updated.     HISTORY:  Allergies   Allergen Reactions    Hydrocodone-Acetaminophen Nausea And Vomiting       REVIEW OF SYSTEMS:  Review of systems is as indicated in HPI otherwise negative.    PHYSICAL EXAM:  Visit Vitals  /77   Pulse 70   Temp 97.8 °F (36.6 °C) (Temporal)   Ht 1.651 m (5' 5\")   Wt 124.6 kg (274 lb 12.8 oz)   SpO2 99%   BMI 45.73 kg/m²        Physical Exam  Vitals and nursing note reviewed.   Constitutional:       Appearance: Normal appearance.   HENT:      Head: Normocephalic and atraumatic.

## 2024-10-10 ENCOUNTER — PATIENT MESSAGE (OUTPATIENT)
Dept: PRIMARY CARE CLINIC | Facility: CLINIC | Age: 44
End: 2024-10-10

## 2024-10-10 DIAGNOSIS — F33.0 MILD EPISODE OF RECURRENT MAJOR DEPRESSIVE DISORDER (HCC): ICD-10-CM

## 2024-10-10 RX ORDER — BUPROPION HYDROCHLORIDE 150 MG/1
150 TABLET ORAL EVERY MORNING
Qty: 90 TABLET | Refills: 1 | Status: SHIPPED | OUTPATIENT
Start: 2024-10-10

## (undated) DEVICE — HANDPIECE ABLAT DIA6MM ENDOMET IMPED CTRL DEV DISP NOVASURE

## (undated) DEVICE — GARMENT,MEDLINE,DVT,INT,CALF,MED, GEN2: Brand: MEDLINE

## (undated) DEVICE — SUT CHRMC 0 27IN CT1 BRN --

## (undated) DEVICE — PAD,NON-ADHERENT,3X8,STERILE,LF,1/PK: Brand: MEDLINE

## (undated) DEVICE — CARDINAL HEALTH FLEXIBLE LIGHT HANDLE COVER: Brand: CARDINAL HEALTH

## (undated) DEVICE — DRAPE,UNDERBUTTOCKS,PCH,STERILE: Brand: MEDLINE

## (undated) DEVICE — DRAPE TWL SURG 16X26IN BLU ORB04] ALLCARE INC]

## (undated) DEVICE — DRESSING,GAUZE,XEROFORM,CURAD,5"X9",ST: Brand: CURAD

## (undated) DEVICE — TRAY PREP DRY W/ PREM GLV 2 APPL 6 SPNG 2 UNDPD 1 OVERWRAP

## (undated) DEVICE — PAD MATERNITY 11IN W/TAILS -- STRL

## (undated) DEVICE — GOWN,REINF,POLY,ECL,PP SLV,XL: Brand: MEDLINE

## (undated) DEVICE — MINOR LITHOTOMY PACK: Brand: MEDLINE INDUSTRIES, INC.

## (undated) DEVICE — CYSTO/BLADDER IRRIGATION SET, REGULATING CLAMP

## (undated) DEVICE — REM POLYHESIVE ADULT PATIENT RETURN ELECTRODE: Brand: VALLEYLAB

## (undated) DEVICE — JELLY LUBRICATING 10GM PREFIL SYR LUBE

## (undated) DEVICE — 2000CC GUARDIAN II: Brand: GUARDIAN